# Patient Record
Sex: FEMALE | Race: WHITE | NOT HISPANIC OR LATINO | Employment: FULL TIME | URBAN - METROPOLITAN AREA
[De-identification: names, ages, dates, MRNs, and addresses within clinical notes are randomized per-mention and may not be internally consistent; named-entity substitution may affect disease eponyms.]

---

## 2021-05-29 ENCOUNTER — RECORDS - HEALTHEAST (OUTPATIENT)
Dept: ADMINISTRATIVE | Facility: CLINIC | Age: 26
End: 2021-05-29

## 2021-10-09 ENCOUNTER — HOSPITAL ENCOUNTER (EMERGENCY)
Facility: HOSPITAL | Age: 26
Discharge: LEFT BEFORE DISCHARGE | End: 2021-10-10

## 2021-10-09 VITALS
WEIGHT: 152 LBS | TEMPERATURE: 97.9 F | DIASTOLIC BLOOD PRESSURE: 89 MMHG | OXYGEN SATURATION: 98 % | HEART RATE: 130 BPM | BODY MASS INDEX: 21.76 KG/M2 | RESPIRATION RATE: 20 BRPM | SYSTOLIC BLOOD PRESSURE: 122 MMHG | HEIGHT: 70 IN

## 2021-10-09 ASSESSMENT — MIFFLIN-ST. JEOR: SCORE: 1504.72

## 2021-10-10 NOTE — ED TRIAGE NOTES
Pt just gotmarried tonight, normally does not drink but tonight had 6 or 7 shots of alcohol, had several brief syncopal events, and is feeling nauseated

## 2022-08-05 ENCOUNTER — LAB REQUISITION (OUTPATIENT)
Dept: LAB | Facility: CLINIC | Age: 27
End: 2022-08-05
Payer: OTHER GOVERNMENT

## 2022-08-05 ENCOUNTER — HOME INFUSION (PRE-WILLOW HOME INFUSION) (OUTPATIENT)
Dept: PHARMACY | Facility: CLINIC | Age: 27
End: 2022-08-05

## 2022-08-05 DIAGNOSIS — Z36.9 ENCOUNTER FOR ANTENATAL SCREENING, UNSPECIFIED: ICD-10-CM

## 2022-08-05 LAB
ABO/RH(D): NORMAL
ANTIBODY SCREEN: NEGATIVE
BASOPHILS # BLD AUTO: 0 10E3/UL (ref 0–0.2)
BASOPHILS NFR BLD AUTO: 0 %
EOSINOPHIL # BLD AUTO: 0.1 10E3/UL (ref 0–0.7)
EOSINOPHIL NFR BLD AUTO: 1 %
ERYTHROCYTE [DISTWIDTH] IN BLOOD BY AUTOMATED COUNT: 13.4 % (ref 10–15)
HCT VFR BLD AUTO: 42.6 % (ref 35–47)
HGB BLD-MCNC: 14.3 G/DL (ref 11.7–15.7)
IMM GRANULOCYTES # BLD: 0 10E3/UL
IMM GRANULOCYTES NFR BLD: 1 %
LYMPHOCYTES # BLD AUTO: 1.8 10E3/UL (ref 0.8–5.3)
LYMPHOCYTES NFR BLD AUTO: 24 %
MCH RBC QN AUTO: 31.4 PG (ref 26.5–33)
MCHC RBC AUTO-ENTMCNC: 33.6 G/DL (ref 31.5–36.5)
MCV RBC AUTO: 94 FL (ref 78–100)
MONOCYTES # BLD AUTO: 0.8 10E3/UL (ref 0–1.3)
MONOCYTES NFR BLD AUTO: 11 %
NEUTROPHILS # BLD AUTO: 4.8 10E3/UL (ref 1.6–8.3)
NEUTROPHILS NFR BLD AUTO: 63 %
NRBC # BLD AUTO: 0 10E3/UL
NRBC BLD AUTO-RTO: 0 /100
PLATELET # BLD AUTO: 251 10E3/UL (ref 150–450)
RBC # BLD AUTO: 4.55 10E6/UL (ref 3.8–5.2)
SPECIMEN EXPIRATION DATE: NORMAL
WBC # BLD AUTO: 7.5 10E3/UL (ref 4–11)

## 2022-08-05 PROCEDURE — 86803 HEPATITIS C AB TEST: CPT | Mod: ORL | Performed by: NURSE PRACTITIONER

## 2022-08-05 PROCEDURE — 87591 N.GONORRHOEAE DNA AMP PROB: CPT | Mod: ORL | Performed by: NURSE PRACTITIONER

## 2022-08-05 PROCEDURE — 87340 HEPATITIS B SURFACE AG IA: CPT | Mod: ORL | Performed by: NURSE PRACTITIONER

## 2022-08-05 PROCEDURE — 87491 CHLMYD TRACH DNA AMP PROBE: CPT | Mod: ORL | Performed by: NURSE PRACTITIONER

## 2022-08-05 PROCEDURE — 86901 BLOOD TYPING SEROLOGIC RH(D): CPT | Mod: ORL | Performed by: NURSE PRACTITIONER

## 2022-08-05 PROCEDURE — 85025 COMPLETE CBC W/AUTO DIFF WBC: CPT | Mod: ORL | Performed by: NURSE PRACTITIONER

## 2022-08-05 PROCEDURE — 86850 RBC ANTIBODY SCREEN: CPT | Mod: ORL | Performed by: NURSE PRACTITIONER

## 2022-08-05 PROCEDURE — 87389 HIV-1 AG W/HIV-1&-2 AB AG IA: CPT | Mod: ORL | Performed by: NURSE PRACTITIONER

## 2022-08-05 PROCEDURE — 86592 SYPHILIS TEST NON-TREP QUAL: CPT | Mod: ORL | Performed by: NURSE PRACTITIONER

## 2022-08-05 PROCEDURE — 86762 RUBELLA ANTIBODY: CPT | Mod: ORL | Performed by: NURSE PRACTITIONER

## 2022-08-05 PROCEDURE — 87086 URINE CULTURE/COLONY COUNT: CPT | Mod: ORL | Performed by: NURSE PRACTITIONER

## 2022-08-05 PROCEDURE — 84999 UNLISTED CHEMISTRY PROCEDURE: CPT | Mod: ORL | Performed by: NURSE PRACTITIONER

## 2022-08-06 LAB
C TRACH DNA SPEC QL PROBE+SIG AMP: NEGATIVE
HIV 1+2 AB+HIV1 P24 AG SERPL QL IA: NONREACTIVE
N GONORRHOEA DNA SPEC QL NAA+PROBE: NEGATIVE

## 2022-08-07 LAB — BACTERIA UR CULT: NORMAL

## 2022-08-08 LAB
HBV SURFACE AG SERPL QL IA: NONREACTIVE
HCV AB SERPL QL IA: NONREACTIVE
RUBV IGG SERPL QL IA: 0.98 INDEX
RUBV IGG SERPL QL IA: NORMAL

## 2022-08-10 DIAGNOSIS — O21.0 HYPEREMESIS GRAVIDARUM: ICD-10-CM

## 2022-08-10 RX ORDER — HEPARIN SODIUM (PORCINE) LOCK FLUSH IV SOLN 100 UNIT/ML 100 UNIT/ML
5 SOLUTION INTRAVENOUS
Status: CANCELLED | OUTPATIENT
Start: 2022-08-10

## 2022-08-10 RX ORDER — ONDANSETRON 2 MG/ML
4 INJECTION INTRAMUSCULAR; INTRAVENOUS ONCE
Status: CANCELLED | OUTPATIENT
Start: 2022-08-10 | End: 2022-08-10

## 2022-08-10 RX ORDER — HEPARIN SODIUM,PORCINE 10 UNIT/ML
5 VIAL (ML) INTRAVENOUS
Status: CANCELLED | OUTPATIENT
Start: 2022-08-10

## 2022-08-10 NOTE — PROGRESS NOTES
This is a recent snapshot of the patient's [facility] medical record.  For current drug dose and complete information and questions, call [phone] or In Basket pool, fv home infusion ([id number])  CSN Number:  [id number]

## 2022-08-12 LAB
Lab: NORMAL
PERFORMING LABORATORY: NORMAL
SPECIMEN STATUS: NORMAL
TEST NAME: NORMAL

## 2022-08-14 LAB — MISCELLANEOUS TEST 1 (ARUP): NORMAL

## 2022-08-24 ENCOUNTER — INFUSION THERAPY VISIT (OUTPATIENT)
Dept: INFUSION THERAPY | Facility: CLINIC | Age: 27
End: 2022-08-24
Attending: NURSE PRACTITIONER
Payer: OTHER GOVERNMENT

## 2022-08-24 VITALS
SYSTOLIC BLOOD PRESSURE: 123 MMHG | HEART RATE: 92 BPM | OXYGEN SATURATION: 98 % | TEMPERATURE: 98.2 F | RESPIRATION RATE: 16 BRPM | DIASTOLIC BLOOD PRESSURE: 77 MMHG

## 2022-08-24 DIAGNOSIS — O21.0 HYPEREMESIS GRAVIDARUM: Primary | ICD-10-CM

## 2022-08-24 PROCEDURE — 250N000011 HC RX IP 250 OP 636: Performed by: NURSE PRACTITIONER

## 2022-08-24 PROCEDURE — 250N000009 HC RX 250: Performed by: NURSE PRACTITIONER

## 2022-08-24 PROCEDURE — 258N000003 HC RX IP 258 OP 636: Performed by: NURSE PRACTITIONER

## 2022-08-24 PROCEDURE — 96375 TX/PRO/DX INJ NEW DRUG ADDON: CPT

## 2022-08-24 PROCEDURE — 96361 HYDRATE IV INFUSION ADD-ON: CPT

## 2022-08-24 PROCEDURE — 96365 THER/PROPH/DIAG IV INF INIT: CPT

## 2022-08-24 RX ORDER — ONDANSETRON 2 MG/ML
4 INJECTION INTRAMUSCULAR; INTRAVENOUS EVERY 6 HOURS PRN
Status: CANCELLED
Start: 2022-08-24

## 2022-08-24 RX ORDER — HEPARIN SODIUM,PORCINE 10 UNIT/ML
5 VIAL (ML) INTRAVENOUS
Status: CANCELLED | OUTPATIENT
Start: 2022-08-24

## 2022-08-24 RX ORDER — ONDANSETRON 2 MG/ML
4 INJECTION INTRAMUSCULAR; INTRAVENOUS ONCE
Status: CANCELLED | OUTPATIENT
Start: 2022-08-24 | End: 2022-08-24

## 2022-08-24 RX ORDER — VENLAFAXINE 37.5 MG/1
37.5 TABLET ORAL ONCE
Status: ON HOLD | COMMUNITY
End: 2023-03-10

## 2022-08-24 RX ORDER — LACTOBACILLUS ACIDOPHILUS/PECT 30 MG-20MG
TABLET ORAL
Status: ON HOLD | COMMUNITY
End: 2023-03-12

## 2022-08-24 RX ORDER — ONDANSETRON 2 MG/ML
4 INJECTION INTRAMUSCULAR; INTRAVENOUS ONCE
Status: COMPLETED | OUTPATIENT
Start: 2022-08-24 | End: 2022-08-24

## 2022-08-24 RX ORDER — HEPARIN SODIUM (PORCINE) LOCK FLUSH IV SOLN 100 UNIT/ML 100 UNIT/ML
5 SOLUTION INTRAVENOUS
Status: CANCELLED | OUTPATIENT
Start: 2022-08-24

## 2022-08-24 RX ADMIN — ONDANSETRON 4 MG: 2 INJECTION INTRAMUSCULAR; INTRAVENOUS at 13:14

## 2022-08-24 RX ADMIN — SODIUM CHLORIDE, POTASSIUM CHLORIDE, SODIUM LACTATE AND CALCIUM CHLORIDE 1000 ML: 600; 310; 30; 20 INJECTION, SOLUTION INTRAVENOUS at 13:05

## 2022-08-24 RX ADMIN — ASCORBIC ACID, VITAMIN A PALMITATE, CHOLECALCIFEROL, THIAMINE HYDROCHLORIDE, RIBOFLAVIN-5 PHOSPHATE SODIUM, PYRIDOXINE HYDROCHLORIDE, NIACINAMIDE, DEXPANTHENOL, ALPHA-TOCOPHEROL ACETATE, VITAMIN K1, FOLIC ACID, BIOTIN, CYANOCOBALAMIN: 200; 3300; 200; 6; 3.6; 6; 40; 15; 10; 150; 600; 60; 5 INJECTION, SOLUTION INTRAVENOUS at 14:16

## 2022-08-24 NOTE — PROGRESS NOTES
Infusion Nursing Note:  Stefany Slater presents today for IVF and antiemetics.    Patient seen by provider today: No   present during visit today: Not Applicable.    Note: Pt arrives to infusion today super nauseous. She reports she vomits 15-30x/day. She is unable to keep any fluids or food down. Denies dizziness/lightheadedness.     Pt reports feeling much better after administration of Zofran.     Intravenous Access:  Peripheral IV placed.    Treatment Conditions:  Not Applicable.    Post Infusion Assessment:  Patient tolerated infusion without incident.  Blood return noted pre and post infusion.  Site patent and intact, free from redness, edema or discomfort.  No evidence of extravasations.  Access discontinued per protocol.     Discharge Plan:   Patient will return as needed for next appointment.   Patient discharged in stable condition accompanied by: self.  Departure Mode: Ambulatory.      Annamarie Rao RN

## 2023-01-06 ENCOUNTER — LAB REQUISITION (OUTPATIENT)
Dept: LAB | Facility: CLINIC | Age: 28
End: 2023-01-06
Payer: OTHER GOVERNMENT

## 2023-01-06 DIAGNOSIS — Z3A.29 29 WEEKS GESTATION OF PREGNANCY: ICD-10-CM

## 2023-01-06 DIAGNOSIS — O99.019 ANEMIA COMPLICATING PREGNANCY, UNSPECIFIED TRIMESTER: ICD-10-CM

## 2023-01-06 DIAGNOSIS — Z01.83 ENCOUNTER FOR BLOOD TYPING: ICD-10-CM

## 2023-01-06 LAB
ERYTHROCYTE [DISTWIDTH] IN BLOOD BY AUTOMATED COUNT: 12.9 % (ref 10–15)
HCT VFR BLD AUTO: 29.5 % (ref 35–47)
HGB BLD-MCNC: 9.5 G/DL (ref 11.7–15.7)
MCH RBC QN AUTO: 29.3 PG (ref 26.5–33)
MCHC RBC AUTO-ENTMCNC: 32.2 G/DL (ref 31.5–36.5)
MCV RBC AUTO: 91 FL (ref 78–100)
PLATELET # BLD AUTO: 279 10E3/UL (ref 150–450)
RBC # BLD AUTO: 3.24 10E6/UL (ref 3.8–5.2)
WBC # BLD AUTO: 9.1 10E3/UL (ref 4–11)

## 2023-01-06 PROCEDURE — 86780 TREPONEMA PALLIDUM: CPT | Mod: ORL | Performed by: OBSTETRICS & GYNECOLOGY

## 2023-01-06 PROCEDURE — 86850 RBC ANTIBODY SCREEN: CPT | Mod: ORL | Performed by: OBSTETRICS & GYNECOLOGY

## 2023-01-06 PROCEDURE — 82728 ASSAY OF FERRITIN: CPT | Mod: ORL | Performed by: OBSTETRICS & GYNECOLOGY

## 2023-01-06 PROCEDURE — 85027 COMPLETE CBC AUTOMATED: CPT | Mod: ORL | Performed by: OBSTETRICS & GYNECOLOGY

## 2023-01-07 LAB
ANTIBODY SCREEN: NEGATIVE
FERRITIN SERPL-MCNC: 9 NG/ML (ref 6–175)
SPECIMEN EXPIRATION DATE: NORMAL
T PALLIDUM AB SER QL: NONREACTIVE

## 2023-02-02 ENCOUNTER — HOSPITAL ENCOUNTER (OUTPATIENT)
Facility: HOSPITAL | Age: 28
End: 2023-02-02
Admitting: OBSTETRICS & GYNECOLOGY
Payer: OTHER GOVERNMENT

## 2023-02-02 ENCOUNTER — HOSPITAL ENCOUNTER (OUTPATIENT)
Facility: HOSPITAL | Age: 28
Discharge: HOME | End: 2023-02-02
Attending: OBSTETRICS & GYNECOLOGY | Admitting: OBSTETRICS & GYNECOLOGY
Payer: OTHER GOVERNMENT

## 2023-02-02 ENCOUNTER — APPOINTMENT (OUTPATIENT)
Dept: ULTRASOUND IMAGING | Facility: HOSPITAL | Age: 28
End: 2023-02-02
Attending: OBSTETRICS & GYNECOLOGY
Payer: OTHER GOVERNMENT

## 2023-02-02 VITALS
RESPIRATION RATE: 18 BRPM | BODY MASS INDEX: 27.63 KG/M2 | SYSTOLIC BLOOD PRESSURE: 123 MMHG | HEIGHT: 70 IN | TEMPERATURE: 97.9 F | HEART RATE: 96 BPM | DIASTOLIC BLOOD PRESSURE: 81 MMHG | OXYGEN SATURATION: 97 % | WEIGHT: 193 LBS

## 2023-02-02 LAB
ALBUMIN UR-MCNC: NEGATIVE MG/DL
APPEARANCE UR: CLEAR
BILIRUB UR QL STRIP: NEGATIVE
COLOR UR AUTO: NORMAL
GLUCOSE UR STRIP-MCNC: NEGATIVE MG/DL
HGB UR QL STRIP: NEGATIVE
KETONES UR STRIP-MCNC: NEGATIVE MG/DL
LEUKOCYTE ESTERASE UR QL STRIP: NEGATIVE
NITRATE UR QL: NEGATIVE
PH UR STRIP: 5.5 [PH] (ref 5–7)
SP GR UR STRIP: 1.01 (ref 1–1.03)
UROBILINOGEN UR STRIP-MCNC: <2 MG/DL

## 2023-02-02 PROCEDURE — 250N000013 HC RX MED GY IP 250 OP 250 PS 637: Performed by: OBSTETRICS & GYNECOLOGY

## 2023-02-02 PROCEDURE — G0463 HOSPITAL OUTPT CLINIC VISIT: HCPCS

## 2023-02-02 PROCEDURE — 81003 URINALYSIS AUTO W/O SCOPE: CPT | Performed by: OBSTETRICS & GYNECOLOGY

## 2023-02-02 PROCEDURE — 76770 US EXAM ABDO BACK WALL COMP: CPT

## 2023-02-02 RX ORDER — ACETAMINOPHEN 500 MG
1000 TABLET ORAL ONCE
Status: COMPLETED | OUTPATIENT
Start: 2023-02-02 | End: 2023-02-02

## 2023-02-02 RX ORDER — FERROUS SULFATE 325(65) MG
325 TABLET ORAL
COMMUNITY

## 2023-02-02 RX ORDER — LIDOCAINE 40 MG/G
CREAM TOPICAL
Status: DISCONTINUED | OUTPATIENT
Start: 2023-02-02 | End: 2023-02-02 | Stop reason: HOSPADM

## 2023-02-02 RX ADMIN — ACETAMINOPHEN 1000 MG: 500 TABLET ORAL at 07:28

## 2023-02-02 ASSESSMENT — ACTIVITIES OF DAILY LIVING (ADL)
ADLS_ACUITY_SCORE: 31
ADLS_ACUITY_SCORE: 31

## 2023-02-02 NOTE — PROGRESS NOTES
Stefany is feeling constant back pain/cramping on the L side. Tenderness on percussion. UA is negative, abdomen soft, nontender. Pt denies any vaginal bleeding or leaking of fluids and reports positive fetal movement. Kidney US is ordered.

## 2023-02-02 NOTE — PROGRESS NOTES
Dr Hines called and notified of US reports. Order to discharge patient home. Pt given discharge paperwork and instructed to return to the hospital if any vaginal bleeding or leaking of fluids, painful, regular contractions or decreased fetal movement. Pt verbalizes understanding.

## 2023-02-02 NOTE — DISCHARGE INSTRUCTIONS
Discharge Instruction for Undelivered Patients  Call your provider if you notice:  Swelling in your face or increased swelling in your hands or legs.  Headaches that are not relieved by Tylenol (acetaminophen).  Changes in your vision (blurring: seeing spots or stars.)  Nausea (sick to your stomach) and vomiting (throwing up).   Weight gain of 5 pounds or more per week.  Heartburn that doesn't go away.  Signs of bladder infection: pain when you urinate (use the toilet), need to go more often and more urgently.  The bag of gaines (rupture of membranes) breaks, or you notice leaking in your underwear.  Bright red blood in your underwear.  Abdominal (lower belly) or stomach pain.  For first baby: Contractions (tightening) less than 5 minutes apart for one hour or more.  Second (plus) baby: Contractions (tightening) less than 10 minutes apart and getting stronger.  *If less than 34 weeks: Contractions (tightening) more than 6 times in one hour.  Increase or change in vaginal discharge (note the color and amount)  Other: ***    Follow-up:  Follow up at scheduled OB visit. Increase water intake, rest, hot pack for back pain and Tylenol as needed.               Understanding Hydronephrosis   Hydronephrosis is when your kidneys fill with too much urine and swell up. It s cause by a problem in the urinary tract that stops urine from draining normally.   [id number]  How hydronephrosis happens   Urine usually flows from the kidneys down through the ureters, bladder, and out of the body through the urethra. Hydronephrosis occurs when something blocks this flow of urine.    What causes hydronephrosis?   The condition has many possible causes. They include:  Narrowing (stricture) in a tube that drains urine (ureter or urethra)  Kidney stone  Blood clot  Enlarged prostate  Cancer growth  Infection  Pregnancy  Fibroids in the uterus  Injury   Symptoms of hydronephrosis   Symptoms may include:  Pain in your side and  back  Pain when urinating  Nausea and vomiting  Small amount of urine or no urine  Urinating often or feeling the urge to urinate often  Urinary incontinence  Blood in urine  Fever   Symptoms will depend on the cause of the blockage and how serious it is.   Diagnosing hydronephrosis  Your healthcare provider will ask about your symptoms and health history. He or she will give you a physical exam. The physical exam may include a rectal exam or a pelvic exam. You may also have tests such as:   Blood tests. These are done to see how well your kidneys are filtering the blood.  Urine test. This is done to look for infections and other problems.             Imaging test. You may have an ultrasound, MRI, or CT scan. These tests make images of your organs and other tissues. They can show blockages, growths, and other problems.  Other imaging tests. You may also have a cystourethrogram, ureterogram, or renogram. These tests create special detailed images of the bladder, ureters, and kidneys.   Treatment for hydronephrosis   Treatment depends on what is causing the problem. Your healthcare providers will need to drain the urine, and treat the cause. You may also see a urologist, gynecologist, or oncologist for treatment.    Treatment may include:  Tube to drain urine. A thin, flexible tube (catheter) may be put into your urethra or through a small cut in the abdomen, and up into the kidney. A small tube (stent) may be put in near the kidney to keep the urine draining. Or a tube may be put through a cut in the skin directly into your kidney.  Surgery. You may need surgery to fix something blocking urine, such as a growth or stricture.   You may also be treated with:  Medicines to treat pain, nausea, and vomiting  Antibiotics to treat an infection   Possible complications of hydronephrosis   In some cases, hydronephrosis may cause long-term (permanent) kidney damage.   When to call your healthcare provider   Call your  healthcare provider if you have any of the following:  Fever of 100.4 F (38 C) or higher, or as directed by your healthcare provider  Pain that gets worse  Symptoms that don t get better, or get worse  New symptoms  Rabia last reviewed this educational content on 12/1/2017 2000-2021 [employer]. All rights reserved. This information is not intended as a substitute for professional medical care. Always follow your healthcare professional's instructions.

## 2023-02-22 ENCOUNTER — LAB REQUISITION (OUTPATIENT)
Dept: LAB | Facility: CLINIC | Age: 28
End: 2023-02-22
Payer: OTHER GOVERNMENT

## 2023-02-22 DIAGNOSIS — Z3A.36 36 WEEKS GESTATION OF PREGNANCY: ICD-10-CM

## 2023-02-22 PROCEDURE — 87653 STREP B DNA AMP PROBE: CPT | Mod: ORL | Performed by: OBSTETRICS & GYNECOLOGY

## 2023-02-23 LAB — GP B STREP DNA SPEC QL NAA+PROBE: NEGATIVE

## 2023-03-09 ENCOUNTER — HOSPITAL ENCOUNTER (INPATIENT)
Facility: CLINIC | Age: 28
LOS: 2 days | Discharge: HOME HEALTH | End: 2023-03-12
Attending: OBSTETRICS & GYNECOLOGY | Admitting: OBSTETRICS & GYNECOLOGY
Payer: OTHER GOVERNMENT

## 2023-03-09 LAB
ABO/RH(D): ABNORMAL
ANTIBODY SCREEN: POSITIVE
SPECIMEN EXPIRATION DATE: ABNORMAL

## 2023-03-09 PROCEDURE — 84156 ASSAY OF PROTEIN URINE: CPT | Performed by: OBSTETRICS & GYNECOLOGY

## 2023-03-09 ASSESSMENT — ACTIVITIES OF DAILY LIVING (ADL): ADLS_ACUITY_SCORE: 35

## 2023-03-10 ENCOUNTER — ANESTHESIA (OUTPATIENT)
Dept: OBGYN | Facility: CLINIC | Age: 28
End: 2023-03-10
Payer: OTHER GOVERNMENT

## 2023-03-10 ENCOUNTER — ANESTHESIA EVENT (OUTPATIENT)
Dept: OBGYN | Facility: CLINIC | Age: 28
End: 2023-03-10
Payer: OTHER GOVERNMENT

## 2023-03-10 PROBLEM — O16.9 HYPERTENSION COMPLICATING PREGNANCY: Status: ACTIVE | Noted: 2023-03-10

## 2023-03-10 LAB
ALBUMIN MFR UR ELPH: 13.2 MG/DL (ref 1–14)
ALT SERPL W P-5'-P-CCNC: <9 U/L (ref 0–45)
ANION GAP SERPL CALCULATED.3IONS-SCNC: 8 MMOL/L (ref 5–18)
ANTIBODY ID: NORMAL
AST SERPL W P-5'-P-CCNC: 15 U/L (ref 0–40)
BUN SERPL-MCNC: 5 MG/DL (ref 8–22)
CALCIUM SERPL-MCNC: 9.9 MG/DL (ref 8.5–10.5)
CHLORIDE BLD-SCNC: 107 MMOL/L (ref 98–107)
CO2 SERPL-SCNC: 24 MMOL/L (ref 22–31)
CREAT SERPL-MCNC: 0.55 MG/DL (ref 0.6–1.1)
CREAT UR-MCNC: 73 MG/DL
ERYTHROCYTE [DISTWIDTH] IN BLOOD BY AUTOMATED COUNT: 15.8 % (ref 10–15)
GFR SERPL CREATININE-BSD FRML MDRD: >90 ML/MIN/1.73M2
GLUCOSE BLD-MCNC: 75 MG/DL (ref 70–125)
HCT VFR BLD AUTO: 31.8 % (ref 35–47)
HGB BLD-MCNC: 10.2 G/DL (ref 11.7–15.7)
MCH RBC QN AUTO: 27 PG (ref 26.5–33)
MCHC RBC AUTO-ENTMCNC: 32.1 G/DL (ref 31.5–36.5)
MCV RBC AUTO: 84 FL (ref 78–100)
PLATELET # BLD AUTO: 227 10E3/UL (ref 150–450)
POTASSIUM BLD-SCNC: 4.3 MMOL/L (ref 3.5–5)
PROT/CREAT 24H UR: 0.18 MG/MG CR
RBC # BLD AUTO: 3.78 10E6/UL (ref 3.8–5.2)
SODIUM SERPL-SCNC: 139 MMOL/L (ref 136–145)
SPECIMEN EXPIRATION DATE: NORMAL
T PALLIDUM AB SER QL: NONREACTIVE
URATE SERPL-MCNC: 4.9 MG/DL (ref 2–7.5)
WBC # BLD AUTO: 8.4 10E3/UL (ref 4–11)

## 2023-03-10 PROCEDURE — 250N000011 HC RX IP 250 OP 636: Performed by: OBSTETRICS & GYNECOLOGY

## 2023-03-10 PROCEDURE — 84550 ASSAY OF BLOOD/URIC ACID: CPT | Performed by: OBSTETRICS & GYNECOLOGY

## 2023-03-10 PROCEDURE — 36415 COLL VENOUS BLD VENIPUNCTURE: CPT | Performed by: OBSTETRICS & GYNECOLOGY

## 2023-03-10 PROCEDURE — 86780 TREPONEMA PALLIDUM: CPT | Performed by: OBSTETRICS & GYNECOLOGY

## 2023-03-10 PROCEDURE — 250N000009 HC RX 250: Performed by: OBSTETRICS & GYNECOLOGY

## 2023-03-10 PROCEDURE — 250N000011 HC RX IP 250 OP 636: Performed by: STUDENT IN AN ORGANIZED HEALTH CARE EDUCATION/TRAINING PROGRAM

## 2023-03-10 PROCEDURE — 00HU33Z INSERTION OF INFUSION DEVICE INTO SPINAL CANAL, PERCUTANEOUS APPROACH: ICD-10-PCS | Performed by: ANESTHESIOLOGY

## 2023-03-10 PROCEDURE — 250N000011 HC RX IP 250 OP 636: Performed by: ANESTHESIOLOGY

## 2023-03-10 PROCEDURE — 85027 COMPLETE CBC AUTOMATED: CPT | Performed by: OBSTETRICS & GYNECOLOGY

## 2023-03-10 PROCEDURE — 86920 COMPATIBILITY TEST SPIN: CPT | Performed by: OBSTETRICS & GYNECOLOGY

## 2023-03-10 PROCEDURE — 722N000001 HC LABOR CARE VAGINAL DELIVERY SINGLE

## 2023-03-10 PROCEDURE — 86870 RBC ANTIBODY IDENTIFICATION: CPT | Performed by: OBSTETRICS & GYNECOLOGY

## 2023-03-10 PROCEDURE — 86901 BLOOD TYPING SEROLOGIC RH(D): CPT | Performed by: OBSTETRICS & GYNECOLOGY

## 2023-03-10 PROCEDURE — 3E0R3BZ INTRODUCTION OF ANESTHETIC AGENT INTO SPINAL CANAL, PERCUTANEOUS APPROACH: ICD-10-PCS | Performed by: ANESTHESIOLOGY

## 2023-03-10 PROCEDURE — 84450 TRANSFERASE (AST) (SGOT): CPT | Performed by: OBSTETRICS & GYNECOLOGY

## 2023-03-10 PROCEDURE — 86850 RBC ANTIBODY SCREEN: CPT | Performed by: OBSTETRICS & GYNECOLOGY

## 2023-03-10 PROCEDURE — 250N000013 HC RX MED GY IP 250 OP 250 PS 637: Performed by: OBSTETRICS & GYNECOLOGY

## 2023-03-10 PROCEDURE — 250N000009 HC RX 250: Performed by: ANESTHESIOLOGY

## 2023-03-10 PROCEDURE — 999N000016 HC STATISTIC ATTENDANCE AT DELIVERY

## 2023-03-10 PROCEDURE — 84460 ALANINE AMINO (ALT) (SGPT): CPT | Performed by: OBSTETRICS & GYNECOLOGY

## 2023-03-10 PROCEDURE — 80048 BASIC METABOLIC PNL TOTAL CA: CPT | Performed by: OBSTETRICS & GYNECOLOGY

## 2023-03-10 PROCEDURE — 0KQM0ZZ REPAIR PERINEUM MUSCLE, OPEN APPROACH: ICD-10-PCS | Performed by: OBSTETRICS & GYNECOLOGY

## 2023-03-10 PROCEDURE — 370N000003 HC ANESTHESIA WARD SERVICE

## 2023-03-10 PROCEDURE — 258N000003 HC RX IP 258 OP 636: Performed by: OBSTETRICS & GYNECOLOGY

## 2023-03-10 PROCEDURE — 120N000001 HC R&B MED SURG/OB

## 2023-03-10 RX ORDER — VENLAFAXINE 37.5 MG/1
37.5 TABLET ORAL ONCE
Status: DISCONTINUED | OUTPATIENT
Start: 2023-03-10 | End: 2023-03-10

## 2023-03-10 RX ORDER — MODIFIED LANOLIN
OINTMENT (GRAM) TOPICAL
Status: DISCONTINUED | OUTPATIENT
Start: 2023-03-10 | End: 2023-03-12 | Stop reason: HOSPADM

## 2023-03-10 RX ORDER — METHYLERGONOVINE MALEATE 0.2 MG/ML
200 INJECTION INTRAVENOUS
Status: DISCONTINUED | OUTPATIENT
Start: 2023-03-10 | End: 2023-03-10 | Stop reason: HOSPADM

## 2023-03-10 RX ORDER — IBUPROFEN 800 MG/1
800 TABLET, FILM COATED ORAL EVERY 6 HOURS PRN
Status: DISCONTINUED | OUTPATIENT
Start: 2023-03-10 | End: 2023-03-12 | Stop reason: HOSPADM

## 2023-03-10 RX ORDER — ONDANSETRON 2 MG/ML
4 INJECTION INTRAMUSCULAR; INTRAVENOUS EVERY 6 HOURS PRN
Status: DISCONTINUED | OUTPATIENT
Start: 2023-03-10 | End: 2023-03-10 | Stop reason: HOSPADM

## 2023-03-10 RX ORDER — LABETALOL HYDROCHLORIDE 5 MG/ML
20 INJECTION, SOLUTION INTRAVENOUS
Status: DISCONTINUED | OUTPATIENT
Start: 2023-03-10 | End: 2023-03-12 | Stop reason: HOSPADM

## 2023-03-10 RX ORDER — CEFAZOLIN SODIUM 2 G/100ML
2 INJECTION, SOLUTION INTRAVENOUS
Status: DISCONTINUED | OUTPATIENT
Start: 2023-03-10 | End: 2023-03-12 | Stop reason: HOSPADM

## 2023-03-10 RX ORDER — VENLAFAXINE HYDROCHLORIDE 37.5 MG/1
37.5 CAPSULE, EXTENDED RELEASE ORAL DAILY
COMMUNITY

## 2023-03-10 RX ORDER — FENTANYL CITRATE 50 UG/ML
50 INJECTION, SOLUTION INTRAMUSCULAR; INTRAVENOUS EVERY 30 MIN PRN
Status: DISCONTINUED | OUTPATIENT
Start: 2023-03-10 | End: 2023-03-10 | Stop reason: HOSPADM

## 2023-03-10 RX ORDER — IBUPROFEN 800 MG/1
800 TABLET, FILM COATED ORAL
Status: DISCONTINUED | OUTPATIENT
Start: 2023-03-10 | End: 2023-03-12 | Stop reason: HOSPADM

## 2023-03-10 RX ORDER — MAGNESIUM SULFATE HEPTAHYDRATE 40 MG/ML
2 INJECTION, SOLUTION INTRAVENOUS
Status: DISCONTINUED | OUTPATIENT
Start: 2023-03-10 | End: 2023-03-12 | Stop reason: HOSPADM

## 2023-03-10 RX ORDER — TERBUTALINE SULFATE 1 MG/ML
0.25 INJECTION, SOLUTION SUBCUTANEOUS
Status: CANCELLED | OUTPATIENT
Start: 2023-03-10

## 2023-03-10 RX ORDER — KETOROLAC TROMETHAMINE 30 MG/ML
30 INJECTION, SOLUTION INTRAMUSCULAR; INTRAVENOUS
Status: DISCONTINUED | OUTPATIENT
Start: 2023-03-10 | End: 2023-03-12 | Stop reason: HOSPADM

## 2023-03-10 RX ORDER — LORAZEPAM 2 MG/ML
2 INJECTION INTRAMUSCULAR
Status: DISCONTINUED | OUTPATIENT
Start: 2023-03-10 | End: 2023-03-12 | Stop reason: HOSPADM

## 2023-03-10 RX ORDER — LIDOCAINE 40 MG/G
CREAM TOPICAL
Status: DISCONTINUED | OUTPATIENT
Start: 2023-03-10 | End: 2023-03-10 | Stop reason: HOSPADM

## 2023-03-10 RX ORDER — LIDOCAINE HCL/EPINEPHRINE/PF 2%-1:200K
VIAL (ML) INJECTION
Status: COMPLETED | OUTPATIENT
Start: 2023-03-10 | End: 2023-03-10

## 2023-03-10 RX ORDER — MISOPROSTOL 200 UG/1
400 TABLET ORAL
Status: DISCONTINUED | OUTPATIENT
Start: 2023-03-10 | End: 2023-03-12 | Stop reason: HOSPADM

## 2023-03-10 RX ORDER — FENTANYL CITRATE 50 UG/ML
100 INJECTION, SOLUTION INTRAMUSCULAR; INTRAVENOUS ONCE
Status: COMPLETED | OUTPATIENT
Start: 2023-03-10 | End: 2023-03-10

## 2023-03-10 RX ORDER — NALBUPHINE HYDROCHLORIDE 10 MG/ML
2.5-5 INJECTION, SOLUTION INTRAMUSCULAR; INTRAVENOUS; SUBCUTANEOUS EVERY 6 HOURS PRN
Status: DISCONTINUED | OUTPATIENT
Start: 2023-03-10 | End: 2023-03-12 | Stop reason: HOSPADM

## 2023-03-10 RX ORDER — CARBOPROST TROMETHAMINE 250 UG/ML
250 INJECTION, SOLUTION INTRAMUSCULAR
Status: DISCONTINUED | OUTPATIENT
Start: 2023-03-10 | End: 2023-03-10 | Stop reason: HOSPADM

## 2023-03-10 RX ORDER — MAGNESIUM SULFATE 4 G/50ML
4 INJECTION INTRAVENOUS
Status: DISCONTINUED | OUTPATIENT
Start: 2023-03-10 | End: 2023-03-12 | Stop reason: HOSPADM

## 2023-03-10 RX ORDER — METOCLOPRAMIDE HYDROCHLORIDE 5 MG/ML
10 INJECTION INTRAMUSCULAR; INTRAVENOUS EVERY 6 HOURS PRN
Status: DISCONTINUED | OUTPATIENT
Start: 2023-03-10 | End: 2023-03-10 | Stop reason: HOSPADM

## 2023-03-10 RX ORDER — SODIUM CHLORIDE, SODIUM LACTATE, POTASSIUM CHLORIDE, CALCIUM CHLORIDE 600; 310; 30; 20 MG/100ML; MG/100ML; MG/100ML; MG/100ML
INJECTION, SOLUTION INTRAVENOUS CONTINUOUS
Status: DISCONTINUED | OUTPATIENT
Start: 2023-03-11 | End: 2023-03-12 | Stop reason: HOSPADM

## 2023-03-10 RX ORDER — EPHEDRINE SULFATE 50 MG/ML
5 INJECTION, SOLUTION INTRAMUSCULAR; INTRAVENOUS; SUBCUTANEOUS
Status: DISCONTINUED | OUTPATIENT
Start: 2023-03-10 | End: 2023-03-10 | Stop reason: HOSPADM

## 2023-03-10 RX ORDER — MAGNESIUM SULFATE HEPTAHYDRATE 500 MG/ML
10 INJECTION, SOLUTION INTRAMUSCULAR; INTRAVENOUS
Status: DISCONTINUED | OUTPATIENT
Start: 2023-03-10 | End: 2023-03-12 | Stop reason: HOSPADM

## 2023-03-10 RX ORDER — DOCUSATE SODIUM 100 MG/1
100 CAPSULE, LIQUID FILLED ORAL DAILY
Status: DISCONTINUED | OUTPATIENT
Start: 2023-03-11 | End: 2023-03-12 | Stop reason: HOSPADM

## 2023-03-10 RX ORDER — PROCHLORPERAZINE 25 MG
25 SUPPOSITORY, RECTAL RECTAL EVERY 12 HOURS PRN
Status: DISCONTINUED | OUTPATIENT
Start: 2023-03-10 | End: 2023-03-10 | Stop reason: HOSPADM

## 2023-03-10 RX ORDER — BUPIVACAINE HYDROCHLORIDE 2.5 MG/ML
INJECTION, SOLUTION EPIDURAL; INFILTRATION; INTRACAUDAL
Status: COMPLETED | OUTPATIENT
Start: 2023-03-10 | End: 2023-03-10

## 2023-03-10 RX ORDER — ONDANSETRON 2 MG/ML
4 INJECTION INTRAMUSCULAR; INTRAVENOUS EVERY 6 HOURS PRN
Status: DISCONTINUED | OUTPATIENT
Start: 2023-03-10 | End: 2023-03-12 | Stop reason: HOSPADM

## 2023-03-10 RX ORDER — ACETAMINOPHEN 325 MG/1
650 TABLET ORAL EVERY 4 HOURS PRN
Status: DISCONTINUED | OUTPATIENT
Start: 2023-03-10 | End: 2023-03-12 | Stop reason: HOSPADM

## 2023-03-10 RX ORDER — PROCHLORPERAZINE MALEATE 10 MG
10 TABLET ORAL EVERY 6 HOURS PRN
Status: DISCONTINUED | OUTPATIENT
Start: 2023-03-10 | End: 2023-03-10 | Stop reason: HOSPADM

## 2023-03-10 RX ORDER — OXYTOCIN/0.9 % SODIUM CHLORIDE 30/500 ML
100-340 PLASTIC BAG, INJECTION (ML) INTRAVENOUS CONTINUOUS PRN
Status: DISCONTINUED | OUTPATIENT
Start: 2023-03-10 | End: 2023-03-12 | Stop reason: HOSPADM

## 2023-03-10 RX ORDER — MISOPROSTOL 100 UG/1
25 TABLET ORAL
Status: DISCONTINUED | OUTPATIENT
Start: 2023-03-10 | End: 2023-03-10 | Stop reason: HOSPADM

## 2023-03-10 RX ORDER — OXYTOCIN/0.9 % SODIUM CHLORIDE 30/500 ML
340 PLASTIC BAG, INJECTION (ML) INTRAVENOUS CONTINUOUS PRN
Status: DISCONTINUED | OUTPATIENT
Start: 2023-03-10 | End: 2023-03-10 | Stop reason: HOSPADM

## 2023-03-10 RX ORDER — OXYTOCIN/0.9 % SODIUM CHLORIDE 30/500 ML
340 PLASTIC BAG, INJECTION (ML) INTRAVENOUS CONTINUOUS PRN
Status: COMPLETED | OUTPATIENT
Start: 2023-03-10 | End: 2023-03-11

## 2023-03-10 RX ORDER — NIFEDIPINE 10 MG/1
10-20 CAPSULE ORAL
Status: DISCONTINUED | OUTPATIENT
Start: 2023-03-10 | End: 2023-03-12 | Stop reason: HOSPADM

## 2023-03-10 RX ORDER — OXYTOCIN 10 [USP'U]/ML
10 INJECTION, SOLUTION INTRAMUSCULAR; INTRAVENOUS
Status: DISCONTINUED | OUTPATIENT
Start: 2023-03-10 | End: 2023-03-10 | Stop reason: HOSPADM

## 2023-03-10 RX ORDER — ONDANSETRON 4 MG/1
4 TABLET, ORALLY DISINTEGRATING ORAL EVERY 6 HOURS PRN
Status: DISCONTINUED | OUTPATIENT
Start: 2023-03-10 | End: 2023-03-10 | Stop reason: HOSPADM

## 2023-03-10 RX ORDER — MISOPROSTOL 200 UG/1
800 TABLET ORAL
Status: DISCONTINUED | OUTPATIENT
Start: 2023-03-10 | End: 2023-03-12 | Stop reason: HOSPADM

## 2023-03-10 RX ORDER — NALOXONE HYDROCHLORIDE 0.4 MG/ML
0.2 INJECTION, SOLUTION INTRAMUSCULAR; INTRAVENOUS; SUBCUTANEOUS
Status: DISCONTINUED | OUTPATIENT
Start: 2023-03-10 | End: 2023-03-10 | Stop reason: HOSPADM

## 2023-03-10 RX ORDER — SODIUM CHLORIDE, SODIUM LACTATE, POTASSIUM CHLORIDE, CALCIUM CHLORIDE 600; 310; 30; 20 MG/100ML; MG/100ML; MG/100ML; MG/100ML
10-125 INJECTION, SOLUTION INTRAVENOUS CONTINUOUS
Status: DISCONTINUED | OUTPATIENT
Start: 2023-03-10 | End: 2023-03-12 | Stop reason: HOSPADM

## 2023-03-10 RX ORDER — CARBOPROST TROMETHAMINE 250 UG/ML
250 INJECTION, SOLUTION INTRAMUSCULAR
Status: DISCONTINUED | OUTPATIENT
Start: 2023-03-10 | End: 2023-03-12 | Stop reason: HOSPADM

## 2023-03-10 RX ORDER — HYDROXYZINE HYDROCHLORIDE 25 MG/1
100 TABLET, FILM COATED ORAL
Status: DISCONTINUED | OUTPATIENT
Start: 2023-03-10 | End: 2023-03-10 | Stop reason: HOSPADM

## 2023-03-10 RX ORDER — METOCLOPRAMIDE 10 MG/1
10 TABLET ORAL EVERY 6 HOURS PRN
Status: DISCONTINUED | OUTPATIENT
Start: 2023-03-10 | End: 2023-03-10 | Stop reason: HOSPADM

## 2023-03-10 RX ORDER — BISACODYL 10 MG
10 SUPPOSITORY, RECTAL RECTAL DAILY PRN
Status: DISCONTINUED | OUTPATIENT
Start: 2023-03-10 | End: 2023-03-12 | Stop reason: HOSPADM

## 2023-03-10 RX ORDER — CITRIC ACID/SODIUM CITRATE 334-500MG
30 SOLUTION, ORAL ORAL
Status: DISCONTINUED | OUTPATIENT
Start: 2023-03-10 | End: 2023-03-10 | Stop reason: HOSPADM

## 2023-03-10 RX ORDER — NALOXONE HYDROCHLORIDE 0.4 MG/ML
0.4 INJECTION, SOLUTION INTRAMUSCULAR; INTRAVENOUS; SUBCUTANEOUS
Status: DISCONTINUED | OUTPATIENT
Start: 2023-03-10 | End: 2023-03-10 | Stop reason: HOSPADM

## 2023-03-10 RX ORDER — OXYTOCIN 10 [USP'U]/ML
10 INJECTION, SOLUTION INTRAMUSCULAR; INTRAVENOUS
Status: DISCONTINUED | OUTPATIENT
Start: 2023-03-10 | End: 2023-03-12 | Stop reason: HOSPADM

## 2023-03-10 RX ORDER — HYDROCORTISONE 25 MG/G
CREAM TOPICAL 3 TIMES DAILY PRN
Status: DISCONTINUED | OUTPATIENT
Start: 2023-03-10 | End: 2023-03-12 | Stop reason: HOSPADM

## 2023-03-10 RX ORDER — VENLAFAXINE HYDROCHLORIDE 37.5 MG/1
37.5 CAPSULE, EXTENDED RELEASE ORAL
Status: DISCONTINUED | OUTPATIENT
Start: 2023-03-10 | End: 2023-03-12 | Stop reason: HOSPADM

## 2023-03-10 RX ORDER — METHYLERGONOVINE MALEATE 0.2 MG/ML
200 INJECTION INTRAVENOUS
Status: DISCONTINUED | OUTPATIENT
Start: 2023-03-10 | End: 2023-03-12 | Stop reason: HOSPADM

## 2023-03-10 RX ORDER — MISOPROSTOL 200 UG/1
800 TABLET ORAL
Status: DISCONTINUED | OUTPATIENT
Start: 2023-03-10 | End: 2023-03-10 | Stop reason: HOSPADM

## 2023-03-10 RX ORDER — LIDOCAINE 40 MG/G
CREAM TOPICAL
Status: CANCELLED | OUTPATIENT
Start: 2023-03-10

## 2023-03-10 RX ORDER — OXYTOCIN/0.9 % SODIUM CHLORIDE 30/500 ML
1-24 PLASTIC BAG, INJECTION (ML) INTRAVENOUS CONTINUOUS
Status: CANCELLED | OUTPATIENT
Start: 2023-03-10

## 2023-03-10 RX ORDER — SODIUM CHLORIDE, SODIUM LACTATE, POTASSIUM CHLORIDE, CALCIUM CHLORIDE 600; 310; 30; 20 MG/100ML; MG/100ML; MG/100ML; MG/100ML
INJECTION, SOLUTION INTRAVENOUS CONTINUOUS PRN
Status: CANCELLED | OUTPATIENT
Start: 2023-03-10

## 2023-03-10 RX ORDER — MISOPROSTOL 200 UG/1
400 TABLET ORAL
Status: DISCONTINUED | OUTPATIENT
Start: 2023-03-10 | End: 2023-03-10 | Stop reason: HOSPADM

## 2023-03-10 RX ORDER — MORPHINE SULFATE 10 MG/ML
10 INJECTION, SOLUTION INTRAMUSCULAR; INTRAVENOUS
Status: COMPLETED | OUTPATIENT
Start: 2023-03-10 | End: 2023-03-10

## 2023-03-10 RX ORDER — FENTANYL/ROPIVACAINE/NS/PF 2MCG/ML-.1
PLASTIC BAG, INJECTION (ML) EPIDURAL
Status: DISCONTINUED | OUTPATIENT
Start: 2023-03-10 | End: 2023-03-10 | Stop reason: HOSPADM

## 2023-03-10 RX ADMIN — BUPIVACAINE HYDROCHLORIDE 5 ML: 2.5 INJECTION, SOLUTION EPIDURAL; INFILTRATION; INTRACAUDAL at 10:53

## 2023-03-10 RX ADMIN — KETOROLAC TROMETHAMINE 30 MG: 30 INJECTION, SOLUTION INTRAMUSCULAR at 22:02

## 2023-03-10 RX ADMIN — MISOPROSTOL 25 MCG: 100 TABLET ORAL at 05:00

## 2023-03-10 RX ADMIN — LIDOCAINE HYDROCHLORIDE,EPINEPHRINE BITARTRATE 3 ML: 20; .005 INJECTION, SOLUTION EPIDURAL; INFILTRATION; INTRACAUDAL; PERINEURAL at 10:51

## 2023-03-10 RX ADMIN — SODIUM CHLORIDE, POTASSIUM CHLORIDE, SODIUM LACTATE AND CALCIUM CHLORIDE 125 ML/HR: 600; 310; 30; 20 INJECTION, SOLUTION INTRAVENOUS at 21:30

## 2023-03-10 RX ADMIN — ONDANSETRON 4 MG: 2 INJECTION INTRAMUSCULAR; INTRAVENOUS at 02:09

## 2023-03-10 RX ADMIN — MISOPROSTOL 25 MCG: 100 TABLET ORAL at 09:02

## 2023-03-10 RX ADMIN — ONDANSETRON 4 MG: 2 INJECTION INTRAMUSCULAR; INTRAVENOUS at 16:40

## 2023-03-10 RX ADMIN — Medication: at 10:45

## 2023-03-10 RX ADMIN — MISOPROSTOL 25 MCG: 100 TABLET ORAL at 02:56

## 2023-03-10 RX ADMIN — PROCHLORPERAZINE EDISYLATE 10 MG: 5 INJECTION INTRAMUSCULAR; INTRAVENOUS at 03:19

## 2023-03-10 RX ADMIN — SODIUM CHLORIDE, POTASSIUM CHLORIDE, SODIUM LACTATE AND CALCIUM CHLORIDE 125 ML/HR: 600; 310; 30; 20 INJECTION, SOLUTION INTRAVENOUS at 02:09

## 2023-03-10 RX ADMIN — Medication 340 ML/HR: at 21:54

## 2023-03-10 RX ADMIN — HYDROXYZINE HYDROCHLORIDE 100 MG: 25 TABLET ORAL at 02:56

## 2023-03-10 RX ADMIN — FENTANYL CITRATE 50 MCG: 50 INJECTION, SOLUTION INTRAMUSCULAR; INTRAVENOUS at 09:12

## 2023-03-10 RX ADMIN — ACETAMINOPHEN 650 MG: 325 TABLET ORAL at 02:55

## 2023-03-10 RX ADMIN — ACETAMINOPHEN 650 MG: 325 TABLET ORAL at 07:00

## 2023-03-10 RX ADMIN — ACETAMINOPHEN 650 MG: 325 TABLET ORAL at 16:40

## 2023-03-10 RX ADMIN — MISOPROSTOL 25 MCG: 100 TABLET ORAL at 07:00

## 2023-03-10 RX ADMIN — MORPHINE SULFATE 10 MG: 10 INJECTION INTRAVENOUS at 02:57

## 2023-03-10 RX ADMIN — FENTANYL CITRATE 100 MCG: 50 INJECTION, SOLUTION INTRAMUSCULAR; INTRAVENOUS at 23:08

## 2023-03-10 RX ADMIN — MISOPROSTOL 400 MCG: 200 TABLET ORAL at 23:03

## 2023-03-10 ASSESSMENT — ACTIVITIES OF DAILY LIVING (ADL)
ADLS_ACUITY_SCORE: 18
FALL_HISTORY_WITHIN_LAST_SIX_MONTHS: NO
ADLS_ACUITY_SCORE: 18
CONCENTRATING,_REMEMBERING_OR_MAKING_DECISIONS_DIFFICULTY: NO
HEARING_DIFFICULTY_OR_DEAF: NO
ADLS_ACUITY_SCORE: 18
TOILETING_ISSUES: NO
ADLS_ACUITY_SCORE: 18
DRESSING/BATHING_DIFFICULTY: NO
DIFFICULTY_EATING/SWALLOWING: NO
CHANGE_IN_FUNCTIONAL_STATUS_SINCE_ONSET_OF_CURRENT_ILLNESS/INJURY: NO
WALKING_OR_CLIMBING_STAIRS_DIFFICULTY: NO
WEAR_GLASSES_OR_BLIND: NO
ADLS_ACUITY_SCORE: 18
DIFFICULTY_COMMUNICATING: NO
ADLS_ACUITY_SCORE: 18
ADLS_ACUITY_SCORE: 18
ADLS_ACUITY_SCORE: 31
ADLS_ACUITY_SCORE: 18
DOING_ERRANDS_INDEPENDENTLY_DIFFICULTY: NO

## 2023-03-10 NOTE — ANESTHESIA PROCEDURE NOTES
Epidural catheter Procedure Note    Pre-Procedure   Staff -        Anesthesiologist:  Misti Griggs MD       Performed By: anesthesiologist       Location: OB       Procedure Start/Stop Times: 3/10/2023 10:33 AM and 3/10/2023 10:56 AM       Pre-Anesthestic Checklist: patient identified, IV checked, risks and benefits discussed, informed consent, monitors and equipment checked, pre-op evaluation, at physician/surgeon's request and post-op pain management  Timeout:       Correct Patient: Yes        Correct Procedure: Yes        Correct Site: Yes        Correct Position: Yes   Procedure Documentation  Procedure: epidural catheter       Patient Position: sitting       Skin prep: Chloraprep       Insertion Site: L2-3. (midline approach).       Technique: LORT saline        ANGELIA at 6.5 cm.       Needle Gauge: 20.        Needle Length (Inches): 3.5           Catheter threaded easily.           Threaded 12 cm at skin.         # of attempts: 1 and  # of redirects:     Assessment/Narrative         Paresthesias: No.       Test dose of 3 mL lidocaine 1.5% w/ 1:200,000 epinephrine at.         Test dose negative, 3 minutes after injection, for signs of intravascular, subdural, or intrathecal injection.       Insertion/Infusion Method: LORT saline       Aspiration negative for Heme or CSF via Epidural Catheter.    Medication(s) Administered   0.25% Bupivacaine PF (Epidural) - EPIDURAL   5 mL - 3/10/2023 10:53:00 AM  2% Lidocaine w/ 1:200K Epi (EPIDURAL) - EPIDURAL   3 mL - 3/10/2023 10:51:00 AM  Medication Administration Time: 3/10/2023 10:33 AM     Comments:  Initial attempt at L4-5 with easy ANGELIA and easy catheter threading. Aspiration positive for heme however so catheter was withdrawn. Second attempt one level higher again with easy ANGELIA and easy catheter threading. Negative aspiration and negative test dose. No complications.       FOR [facility] ([facility]) ONLY:   Pain Team Contact information: please page the Pain Team  "[facility]. Search \"Pain\". During daytime hours, please page the attending first. At night please page the resident first.    "

## 2023-03-10 NOTE — PLAN OF CARE
Problem: Plan of Care - These are the overarching goals to be used throughout the patient stay.    Goal: Plan of Care Review  Description: The Plan of Care Review/Shift note should be completed every shift.  The Outcome Evaluation is a brief statement about your assessment that the patient is improving, declining, or no change.  This information will be displayed automatically on your shift note.  Outcome: Progressing       Patient started on Cytotec. Here with high Bps and symptotic. At this time no meds needed to be given for Bps. Patient has emesis frequent and did every day of her pregnancy. See Mar for what was given. Per patient she did not want reglan due to how she reacted last time she received it.

## 2023-03-10 NOTE — PROGRESS NOTES
"Pt arrived to triage with spouse for rule out preeclampsia. Pt reports \"feeling like her BP is high\", headache, numbness/heaviness in legs causing a fear of falling.   , 38w3d. Hx anxiety taking effexor and intermittently high Bps recently in pregnancy.   FHR category I. Linda irregularly, reported as mild back pain. +FM. Denies VB and LOF.   Dr. Burks notified of pt arrival and assessment findings. Orders received for HELLP labs and serial Bps.   "

## 2023-03-10 NOTE — ANESTHESIA PREPROCEDURE EVALUATION
Anesthesia Pre-Procedure Evaluation    Patient: Stefany Slater   MRN: [id number] : 1995        Procedure :           Past Medical History:   Diagnosis Date     Anxiety       Past Surgical History:   Procedure Laterality Date     TONSILLECTOMY Bilateral 2013      Allergies   Allergen Reactions     Nuts       Social History     Tobacco Use     Smoking status: Never     Passive exposure: Never     Smokeless tobacco: Never   Substance Use Topics     Alcohol use: Not Currently      Wt Readings from Last 1 Encounters:   23 88 kg (194 lb)        Anesthesia Evaluation            ROS/MED HX  ENT/Pulmonary:  - neg pulmonary ROS     Neurologic:       Cardiovascular:  - neg cardiovascular ROS     METS/Exercise Tolerance:     Hematologic:  - neg hematologic  ROS     Musculoskeletal:       GI/Hepatic:       Renal/Genitourinary:       Endo:       Psychiatric/Substance Use:     (+) psychiatric history anxiety     Infectious Disease:       Malignancy:       Other:               OUTSIDE LABS:  CBC:   Lab Results   Component Value Date    WBC 8.4 03/10/2023    WBC 9.1 2023    HGB 10.2 (L) 03/10/2023    HGB 9.5 (L) 2023    HCT 31.8 (L) 03/10/2023    HCT 29.5 (L) 2023     03/10/2023     2023     BMP:   Lab Results   Component Value Date     03/10/2023    POTASSIUM 4.3 03/10/2023    CHLORIDE 107 03/10/2023    CO2 24 03/10/2023    BUN 5 (L) 03/10/2023    CR 0.55 (L) 03/10/2023    GLC 75 03/10/2023     COAGS: No results found for: PTT, INR, FIBR  POC: No results found for: BGM, HCG, HCGS  HEPATIC:   Lab Results   Component Value Date    ALT <9 03/10/2023    AST 15 03/10/2023     OTHER:   Lab Results   Component Value Date    KD 9.9 03/10/2023       Anesthesia Plan    ASA Status:  2      Anesthesia Type: Epidural.              Consents    Anesthesia Plan(s) and associated risks, benefits, and realistic alternatives discussed. Questions answered and patient/representative(s)  expressed understanding.    - Discussed:     - Discussed with:  Patient, Spouse         Postoperative Care            Comments:    Other Comments: Patient requests labor epidural. Chart reviewed, including labs. Reviewed options and risks with the patient, including but not limited to: bleeding, infection, damage to tissues under the skin(nerves, muscles, blood vessels), hypotension, headache, and epidural failure. Questions answered, consent signed. Patient agrees to elective labor epidural.             Misti Griggs MD

## 2023-03-10 NOTE — H&P
[facility] Labor and Delivery History and Physical    Stefany Slater MRN# [id number]   Age: 27 year old YOB: 1995     Date of Admission:  3/9/2023    Primary care provider: Rita Hines           Chief Complaint:   Stefany Slater is a 27 year old  1 female who is 38w3d pregnant and being admitted for hypertension.  She admits to headache and some blurred vision but not seeing spots.  She denies any epigastric pain.  She is having some contractions that are mild and felt more in her lower back.  She denies any vaginal bleeding or leakage of fluid.  She endorses good fetal movement.  She has not been diagnosed with hypertensive disease in the pregnancy so far..          Pregnancy history:     OBSTETRIC HISTORY:    OB History    Para Term  AB Living   1 0 0 0 0 0   SAB IAB Ectopic Multiple Live Births   0 0 0 0 0      # Outcome Date GA Lbr Alex/2nd Weight Sex Delivery Anes PTL Lv   1 Current                EDC: Estimated Date of Delivery: 3/21/23    Prenatal Labs:   Lab Results   Component Value Date    AS Positive (A) 03/10/2023    HEPBANG Nonreactive 2022    HGB 10.2 (L) 03/10/2023       GBS Status:   No results found for: GBS    Active Problem List  Patient Active Problem List   Diagnosis     Hyperemesis gravidarum     Hypertension complicating pregnancy       Medication Prior to Admission  Medications Prior to Admission   Medication Sig Dispense Refill Last Dose     venlafaxine (EFFEXOR) 37.5 MG tablet Take 37.5 mg by mouth once   3/9/2023     doxylamine (UNISOM) 25 MG TABS tablet Take 25 mg by mouth At Bedtime        ferrous sulfate (FEROSUL) 325 (65 Fe) MG tablet Take 325 mg by mouth daily (with breakfast)        Pyridoxine HCl (VITAMIN B6) 250 MG TABS       .        Maternal Past Medical History:     Past Medical History:   Diagnosis Date     Anxiety                        Family History:   This patient has no significant family history             Social History:   This patient has no significant social history         Review of Systems:   CONSTITUTIONAL: NEGATIVE for fever, chills, change in weight  ENT/MOUTH: NEGATIVE for ear, mouth and throat problems  RESP: NEGATIVE for significant cough or SOB  CV: NEGATIVE for chest pain, palpitations or peripheral edema          Physical Exam:   Vitals were reviewed  Temp: 98.4  F (36.9  C) Temp src: Oral BP: (!) 148/80 Pulse: 99   Resp: 16 SpO2: 99 %      Lungs:   No increased work of breathing, good air exchange, clear to auscultation bilaterally, no crackles or wheezing     Cardiovascular:   regular rate and rhythm     Abdomen:   No scars, normal bowel sounds, soft, non-distended, non-tender, no masses palpated, no hepatosplenomegally     Neurologic:   Deep Tendon Reflexes:  Right Knee:  1+  Left Knee:  1+      Cervix:   Membranes: intact   Dilation: 2   Effacement: 30%   Station:-3   Consistency: firm   Position: Mid  Presentation:Vertex  Fetal Heart Rate Tracins and category 1  Tocometer: frequency q 5 minutes and mild  Platelet: 227  AST: 15  ALT: Less than 9  Protein creatinine ratio: 0.18                     Assessment:   Stefany Slater is a 38w3d pregnant female admitted with gestational hypertension with a few severe range blood pressures.          Plan:   As she is 38 weeks I recommend proceeding with ripening the cervix and the induction of labor.  Risks and benefits were reviewed including slightly higher risk of  rate.  I also discussed the risk of worsening of the hypertensive disease with the severe sequelae of this.  All questions were answered.  She verbalized understanding the above and agrees with the plan.  The plan will be to ripen her with Cytotec tonight and then moved to Pitocin once her Lomas score improves.  We will treat her with nifedipine if she has any more severe level blood pressures.  We will start magnesium only if she has persistent severe level blood  pressures.  Alex Burks MD

## 2023-03-10 NOTE — PROGRESS NOTES
HELLP labs resulted, all WNL. BP range in 140s-160s/80s-100s--no consecutive severe range Bps. Pt still report severe H/A and nausea. Dr. Burks at bedside to assess pt. Orders received to admit. Discussed with pt plan to ripen cervix.

## 2023-03-11 LAB
ABO/RH(D): NORMAL
APTT PPP: 24 SECONDS (ref 22–38)
APTT PPP: 25 SECONDS (ref 22–38)
APTT PPP: 26 SECONDS (ref 22–38)
APTT PPP: 27 SECONDS (ref 22–38)
BLD PROD TYP BPU: NORMAL
BLD PROD TYP BPU: NORMAL
BLOOD COMPONENT TYPE: NORMAL
BLOOD COMPONENT TYPE: NORMAL
CODING SYSTEM: NORMAL
CODING SYSTEM: NORMAL
CROSSMATCH: NORMAL
CROSSMATCH: NORMAL
D DIMER PPP FEU-MCNC: 2.23 UG/ML FEU (ref 0–0.5)
D DIMER PPP FEU-MCNC: 2.44 UG/ML FEU (ref 0–0.5)
D DIMER PPP FEU-MCNC: 3.19 UG/ML FEU (ref 0–0.5)
D DIMER PPP FEU-MCNC: 5.77 UG/ML FEU (ref 0–0.5)
ERYTHROCYTE [DISTWIDTH] IN BLOOD BY AUTOMATED COUNT: 15.2 % (ref 10–15)
ERYTHROCYTE [DISTWIDTH] IN BLOOD BY AUTOMATED COUNT: 15.2 % (ref 10–15)
ERYTHROCYTE [DISTWIDTH] IN BLOOD BY AUTOMATED COUNT: 15.3 % (ref 10–15)
ERYTHROCYTE [DISTWIDTH] IN BLOOD BY AUTOMATED COUNT: 15.7 % (ref 10–15)
FETAL BLEED SCREEN: NORMAL
FIBRINOGEN PPP-MCNC: 371 MG/DL (ref 170–490)
FIBRINOGEN PPP-MCNC: 381 MG/DL (ref 170–490)
FIBRINOGEN PPP-MCNC: 419 MG/DL (ref 170–490)
FIBRINOGEN PPP-MCNC: 493 MG/DL (ref 170–490)
HCT VFR BLD AUTO: 25.1 % (ref 35–47)
HCT VFR BLD AUTO: 25.7 % (ref 35–47)
HCT VFR BLD AUTO: 26.3 % (ref 35–47)
HCT VFR BLD AUTO: 27.5 % (ref 35–47)
HGB BLD-MCNC: 8.2 G/DL (ref 11.7–15.7)
HGB BLD-MCNC: 8.4 G/DL (ref 11.7–15.7)
HGB BLD-MCNC: 8.5 G/DL (ref 11.7–15.7)
HGB BLD-MCNC: 8.6 G/DL (ref 11.7–15.7)
HGB BLD-MCNC: 9.3 G/DL (ref 11.7–15.7)
HOLD SPECIMEN: NORMAL
INR PPP: 0.99 (ref 0.85–1.15)
INR PPP: 1.05 (ref 0.85–1.15)
INR PPP: 1.1 (ref 0.85–1.15)
INR PPP: 1.1 (ref 0.85–1.15)
ISSUE DATE AND TIME: NORMAL
ISSUE DATE AND TIME: NORMAL
MCH RBC QN AUTO: 27.1 PG (ref 26.5–33)
MCH RBC QN AUTO: 27.8 PG (ref 26.5–33)
MCH RBC QN AUTO: 27.9 PG (ref 26.5–33)
MCH RBC QN AUTO: 28.1 PG (ref 26.5–33)
MCHC RBC AUTO-ENTMCNC: 31.2 G/DL (ref 31.5–36.5)
MCHC RBC AUTO-ENTMCNC: 33.1 G/DL (ref 31.5–36.5)
MCHC RBC AUTO-ENTMCNC: 33.5 G/DL (ref 31.5–36.5)
MCHC RBC AUTO-ENTMCNC: 33.8 G/DL (ref 31.5–36.5)
MCV RBC AUTO: 83 FL (ref 78–100)
MCV RBC AUTO: 83 FL (ref 78–100)
MCV RBC AUTO: 84 FL (ref 78–100)
MCV RBC AUTO: 87 FL (ref 78–100)
PLATELET # BLD AUTO: 178 10E3/UL (ref 150–450)
PLATELET # BLD AUTO: 190 10E3/UL (ref 150–450)
PLATELET # BLD AUTO: 248 10E3/UL (ref 150–450)
PLATELET # BLD AUTO: 248 10E3/UL (ref 150–450)
RBC # BLD AUTO: 3.01 10E6/UL (ref 3.8–5.2)
RBC # BLD AUTO: 3.03 10E6/UL (ref 3.8–5.2)
RBC # BLD AUTO: 3.06 10E6/UL (ref 3.8–5.2)
RBC # BLD AUTO: 3.31 10E6/UL (ref 3.8–5.2)
SPECIMEN EXPIRATION DATE: NORMAL
UNIT ABO/RH: NORMAL
UNIT ABO/RH: NORMAL
UNIT NUMBER: NORMAL
UNIT NUMBER: NORMAL
UNIT STATUS: NORMAL
UNIT STATUS: NORMAL
UNIT TYPE ISBT: 9500
UNIT TYPE ISBT: 9500
WBC # BLD AUTO: 16.3 10E3/UL (ref 4–11)
WBC # BLD AUTO: 17.8 10E3/UL (ref 4–11)
WBC # BLD AUTO: 19 10E3/UL (ref 4–11)
WBC # BLD AUTO: 19.7 10E3/UL (ref 4–11)

## 2023-03-11 PROCEDURE — 85027 COMPLETE CBC AUTOMATED: CPT | Performed by: OBSTETRICS & GYNECOLOGY

## 2023-03-11 PROCEDURE — 250N000013 HC RX MED GY IP 250 OP 250 PS 637: Performed by: OBSTETRICS & GYNECOLOGY

## 2023-03-11 PROCEDURE — 250N000013 HC RX MED GY IP 250 OP 250 PS 637: Performed by: STUDENT IN AN ORGANIZED HEALTH CARE EDUCATION/TRAINING PROGRAM

## 2023-03-11 PROCEDURE — 85610 PROTHROMBIN TIME: CPT | Performed by: OBSTETRICS & GYNECOLOGY

## 2023-03-11 PROCEDURE — 250N000011 HC RX IP 250 OP 636: Performed by: OBSTETRICS & GYNECOLOGY

## 2023-03-11 PROCEDURE — 250N000009 HC RX 250: Performed by: STUDENT IN AN ORGANIZED HEALTH CARE EDUCATION/TRAINING PROGRAM

## 2023-03-11 PROCEDURE — 85384 FIBRINOGEN ACTIVITY: CPT | Performed by: OBSTETRICS & GYNECOLOGY

## 2023-03-11 PROCEDURE — 85461 HEMOGLOBIN FETAL: CPT | Performed by: OBSTETRICS & GYNECOLOGY

## 2023-03-11 PROCEDURE — 120N000001 HC R&B MED SURG/OB

## 2023-03-11 PROCEDURE — 85379 FIBRIN DEGRADATION QUANT: CPT | Performed by: OBSTETRICS & GYNECOLOGY

## 2023-03-11 PROCEDURE — P9016 RBC LEUKOCYTES REDUCED: HCPCS | Performed by: OBSTETRICS & GYNECOLOGY

## 2023-03-11 PROCEDURE — 85730 THROMBOPLASTIN TIME PARTIAL: CPT | Performed by: OBSTETRICS & GYNECOLOGY

## 2023-03-11 PROCEDURE — 250N000009 HC RX 250: Performed by: OBSTETRICS & GYNECOLOGY

## 2023-03-11 PROCEDURE — 36415 COLL VENOUS BLD VENIPUNCTURE: CPT | Performed by: OBSTETRICS & GYNECOLOGY

## 2023-03-11 PROCEDURE — 999N000249 HC STATISTIC C-SECTION ON UNIT

## 2023-03-11 RX ADMIN — Medication 100 ML/HR: at 00:20

## 2023-03-11 RX ADMIN — VENLAFAXINE HYDROCHLORIDE 37.5 MG: 37.5 CAPSULE, EXTENDED RELEASE ORAL at 08:51

## 2023-03-11 RX ADMIN — HUMAN RHO(D) IMMUNE GLOBULIN 300 MCG: 1500 SOLUTION INTRAMUSCULAR; INTRAVENOUS at 02:39

## 2023-03-11 RX ADMIN — ACETAMINOPHEN 650 MG: 325 TABLET ORAL at 03:11

## 2023-03-11 RX ADMIN — DOCUSATE SODIUM 100 MG: 100 CAPSULE, LIQUID FILLED ORAL at 08:51

## 2023-03-11 RX ADMIN — IBUPROFEN 800 MG: 800 TABLET ORAL at 08:51

## 2023-03-11 RX ADMIN — ACETAMINOPHEN 650 MG: 325 TABLET ORAL at 18:40

## 2023-03-11 RX ADMIN — ACETAMINOPHEN 650 MG: 325 TABLET ORAL at 08:51

## 2023-03-11 RX ADMIN — MINERAL OIL, PETROLATUM, PHENYLEPHRINE HCL 1 G: 2.5; 140; 749 OINTMENT TOPICAL at 21:47

## 2023-03-11 RX ADMIN — IBUPROFEN 800 MG: 800 TABLET ORAL at 18:40

## 2023-03-11 RX ADMIN — EPSOM SALT: 1 GRANULE ORAL; TOPICAL at 21:47

## 2023-03-11 ASSESSMENT — ACTIVITIES OF DAILY LIVING (ADL)
ADLS_ACUITY_SCORE: 18

## 2023-03-11 NOTE — ANESTHESIA POSTPROCEDURE EVALUATION
Patient: Stefany Slater    Procedure: * No procedures listed *       Anesthesia Type:  Epidural    Note:  Disposition: Inpatient   Postop Pain Control: Uneventful            Sign Out: Well controlled pain   PONV: No   Neuro/Psych: Uneventful            Sign Out: Acceptable/Baseline neuro status   Airway/Respiratory: Uneventful            Sign Out: Acceptable/Baseline resp. status   CV/Hemodynamics: Uneventful            Sign Out: Acceptable CV status; No obvious hypovolemia; No obvious fluid overload   Other NRE: NONE   DID A NON-ROUTINE EVENT OCCUR? No           Last vitals:  Vitals:    03/11/23 0400 03/11/23 0409 03/11/23 0509   BP: 136/73 124/67 131/76   Pulse:  93 85   Resp:  16 16   Temp:  36.9  C (98.4  F) 36.6  C (97.9  F)   SpO2: 94% 94% 96%       Electronically Signed By: Ravi Godinez MD  March 11, 2023  5:45 AM

## 2023-03-11 NOTE — PROGRESS NOTES
"Postpartum Day 1: , PPH, Uterine Atony      Subjective:  The patient feels well. The patient has no emotional concerns. Pain is well controlled with current medications. The patient is ambulating well. She is voiding and passing gas.The amount and color of the lochia is appropriate for the duration of recovery, patient denies clots. The baby is well in the NICU with breathing issues.     Objective   The patient has a blood pressure which is within the normal range. Urinary output is adequate.     Exam:   /81   Pulse 85   Temp 97.9  F (36.6  C) (Oral)   Resp 16   Ht 1.778 m (5' 10\")   Wt 88 kg (194 lb)   SpO2 96%   Breastfeeding Unknown   BMI 27.84 kg/m    General: NAD  Abdomen: soft, NT  Fundus: firm, nontender  Ext: no pain       Impression:   Normal postpartum course.   PPD 1  PPH uterine atony, s/p PRBCs  Hemoglobin stable    Plan:   - Continue current care.  - Doing well  - Likely home tomorrow    Santo Nunez M.D., PITER    "

## 2023-03-11 NOTE — L&D DELIVERY NOTE
Delivery Summary    Stefany Slater MRN# [id number]   Age: 27 year old YOB: 1995     ASSESSMENT & PLAN: ASVD at term,Maria Del Carmen Arora-Stefany [[id number]]    Labor Event Times    Dilation complete date: 3/10/23 Complete time:  5:45 PM   Start pushing date/time: 3/10/2023 1745      Labor Length    2nd Stage (hrs): 4 (min): 1   3rd Stage (hrs): 0 (min): 4      Labor Events     labor?: No   steroids: None  Labor Type: Induction/Cervical ripening  Predominate monitoring during 1st stage: intermittent electronic fetal monitoring     Antibiotics received during labor?: No     Rupture date/time: 3/10/23 1116   Rupture type: Spontaneous rupture of membranes occuring during spontaneous labor or augmentation  Fluid color: Clear  Fluid odor: Normal     Induction: Gel, Oxytocin  Induction date/time:     Cervical ripening date/time:     Indications for induction: Hypertension     1:1 continuous labor support provided by?: RN Labor partogram used?: no      Delivery/Placenta Date and Time    Delivery Date: 3/10/23 Delivery Time:  9:46 PM   Placenta Date/Time: 3/10/2023  9:50 PM   Other personnel present at delivery:  Provider Role   Gaurang Hammond MD          Vaginal Counts     Initial count performed by 2 team members:  Two Team Members   denis early Suture Needles Sponges (RETIRED) Instruments   Initial counts       Added to count       Relief counts       Final counts             Placed during labor Accounted for at the end of labor   FSE No NA   IUPC No NA   Cervidil No NA                     Apgars    Living status: Living   1 Minute 5 Minute 10 Minute 15 Minute 20 Minute   Skin color:  1  2      Heart rate:  2  2      Reflex irritability:  1  1      Muscle tone:  2  2      Respiratory effort:  2  2      Total:  8  9      Apgars assigned by: LUIS WEST     Cord    Cord Complications: None               Stem cell collection?: No       [place] Resuscitation   "  Methods: Oximetry, Norris Puff  [place] Care at Delivery: Requested by Dr. Hammond to attend the delivery of this term, male infant with a gestational age of 38 3/7 weeks secondary to delayed transition.      Infant delivered at 2146 hours on 3/10/2023.  NICU team called at 3 min of age for delayed transition and decreased tone. The infant was stimulated, cried and had spontaneous respirations on the warmer when NICU team arrived.  Infant with shallow spontaneous respirations noted.  Pulse ox placed on right wrist and O2 saturations at 4 min were noted to be 75%.  Infant with decreased breath sounds upon auscultation, CPAP +5 in 21% O2 was given for 2 min.  At 7 min of age O2 saturations noted to be > 85%.  CPAP removed at 9 min of age, infant in room air with no further issues.  O2 saturations in room air are > 95%.  Infant required no further resuscitation. Apgars were 8 at five minute and 9 at ten minutes of age. Gross physical exam is WNL. Infant was shown to mother and father, handoff given to nursery nurse, and will be transferred to the  nursery for further care.    This resuscitation and all procedures were performed by this author.    Luz WEST, APBLO 3/10/2023 10:04 PM   Advanced Practice Providers  [facility]       [place] Measurements    Weight: 8 lb 15.2 oz Length: 1' 9.25\"   Head circumference: 35.5 cm       Labor Events and Shoulder Dystocia    Fetal Tracing Prior to Delivery: Category 1  Shoulder dystocia present?: Neg     Delivery (Maternal) (Provider to Complete) (332312)    Episiotomy: None  Perineal lacerations: 2nd    Est. blood loss (mL): 250  Repair suture: 3-0 Vicryl  Number of repair packets: 1  Genital tract inspection done: Pos     Blood Loss  Mother: Stefany Slater SABRINA #[id number]   Start of Mother's Information    Delivery Blood Loss  03/10/23 0946 - 03/10/23 0296    EBL (mL) Hospital Encounter 250 mL    Delivery QBL (mL) " Hospital Encounter 605 mL    Postpartum QBL (mL) Hospital Encounter 826 mL    Total  1681 mL         End of Mother's Information  Mother: Stefany Slater #[id number]          Delivery - Provider to Complete ([id number])    Attempted Delivery Types (Choose all that apply): Spontaneous Vaginal Delivery  Delivery Type (Choose the 1 that will go to the Birth History): Vaginal, Spontaneous                   Other personnel:  Provider Role   Gaurang Hammond MD                 Placenta    Date/Time: 3/10/2023  9:50 PM  Removal: Spontaneous  Disposition: Hospital disposal           Anesthesia    Method: Epidural  Cervical dilation at placement: 4-7                Presentation and Position    Presentation: Vertex    Position: Left Occiput Anterior                 Gaurang Hammond MD

## 2023-03-11 NOTE — PROGRESS NOTES
OB NOTE    PPH of 1600 ml  Cause:Atony  Has responded to evacuation/massage, Pitocin, Cytotec  Hgb 8.2 but appears symptomatic  Hemorrhage protocol initiated  Informed consent obtained for transfusion 2 unit(s) PRBC    Gaurang Hammond MD

## 2023-03-12 VITALS
SYSTOLIC BLOOD PRESSURE: 138 MMHG | TEMPERATURE: 98.5 F | WEIGHT: 194 LBS | DIASTOLIC BLOOD PRESSURE: 80 MMHG | BODY MASS INDEX: 27.77 KG/M2 | HEART RATE: 94 BPM | OXYGEN SATURATION: 94 % | HEIGHT: 70 IN | RESPIRATION RATE: 16 BRPM

## 2023-03-12 PROCEDURE — 250N000013 HC RX MED GY IP 250 OP 250 PS 637: Performed by: STUDENT IN AN ORGANIZED HEALTH CARE EDUCATION/TRAINING PROGRAM

## 2023-03-12 PROCEDURE — 250N000013 HC RX MED GY IP 250 OP 250 PS 637: Performed by: OBSTETRICS & GYNECOLOGY

## 2023-03-12 RX ORDER — ACETAMINOPHEN 325 MG/1
650 TABLET ORAL EVERY 6 HOURS PRN
Qty: 20 TABLET | Refills: 0 | Status: SHIPPED | OUTPATIENT
Start: 2023-03-12

## 2023-03-12 RX ORDER — DOCUSATE SODIUM 100 MG/1
100 CAPSULE, LIQUID FILLED ORAL 2 TIMES DAILY PRN
Qty: 20 CAPSULE | Refills: 0 | Status: ON HOLD | OUTPATIENT
Start: 2023-03-12 | End: 2023-04-03

## 2023-03-12 RX ORDER — IBUPROFEN 800 MG/1
800 TABLET, FILM COATED ORAL EVERY 6 HOURS PRN
Qty: 30 TABLET | Refills: 0 | Status: SHIPPED | OUTPATIENT
Start: 2023-03-12

## 2023-03-12 RX ADMIN — HYDROCORTISONE: 25 CREAM TOPICAL at 13:50

## 2023-03-12 RX ADMIN — ACETAMINOPHEN 650 MG: 325 TABLET ORAL at 07:04

## 2023-03-12 RX ADMIN — HYDROCORTISONE: 25 CREAM TOPICAL at 13:49

## 2023-03-12 RX ADMIN — BENZOCAINE AND LEVOMENTHOL: 200; 5 SPRAY TOPICAL at 14:02

## 2023-03-12 RX ADMIN — IBUPROFEN 800 MG: 800 TABLET ORAL at 14:02

## 2023-03-12 RX ADMIN — ACETAMINOPHEN 650 MG: 325 TABLET ORAL at 01:07

## 2023-03-12 RX ADMIN — IBUPROFEN 800 MG: 800 TABLET ORAL at 01:07

## 2023-03-12 RX ADMIN — IBUPROFEN 800 MG: 800 TABLET ORAL at 07:04

## 2023-03-12 RX ADMIN — VENLAFAXINE HYDROCHLORIDE 37.5 MG: 37.5 CAPSULE, EXTENDED RELEASE ORAL at 09:40

## 2023-03-12 ASSESSMENT — ACTIVITIES OF DAILY LIVING (ADL)
ADLS_ACUITY_SCORE: 18

## 2023-03-12 NOTE — PROGRESS NOTES
Progress Note    Doing well. No complaints. No PIH symptoms. Denies dizziness or lightheadedness with standing. Normal lochia. Tolerating PO. Ambulating. Voiding.     Temp:  [97.7  F (36.5  C)-98.1  F (36.7  C)] 97.7  F (36.5  C)  Pulse:  [] 76  Resp:  [16] 16  BP: (118-133)/(70-77) 133/74  SpO2:  [92 %-97 %] 94 %    NAD, AAO x 3  Abdomen soft, non tender  Uterus firm, below umbilicus  No c/c/e    Hgb: 10.2 --> 8.2 --> 8.5 --> 8.4 --> 8.6 --> 9.3    A/P: 28 yo PPD #2 s/p  with 1600 ml EBL  -- PPH with uterine atony. Symptomatic acute blood loss anemia - S/p RBCs. Doing well. Now asymptomatic.  -- Routine cares  -- Discharge home today    Yris Krause MD, FACOG  P) [phone]

## 2023-03-12 NOTE — PLAN OF CARE
Problem: Postpartum (Vaginal Delivery)  Goal: Optimal Pain Control and Function  Outcome: Progressing  Intervention: Prevent or Manage Pain     Vital signs WNL. Patient reports perineum and rectal pain/pressure especially with ambulation. Right labia swollen, moderate sized edematous hemorrhoid also present. Mag Sulfate packs and hemorrhoid cream applied, ice encouraged. Improvement already noted. Encouraged tub soak, patient declined. Tylenol and Ibuprofen given every 6 hours.    Problem: Postpartum (Vaginal Delivery)  Goal: Successful Maternal Role Transition  Outcome: Progressing    Patient up to [facility] x 3 this shift. Patient up independently, no reports of lightheaded or dizziness. Mom pumping independently in her room. Encouraged pumping every three hours today.

## 2023-03-12 NOTE — PLAN OF CARE
Care plan is complete.  Pt is declining an MMR or extra lab draw.  Discharging home.  Baby will remain in special care.

## 2023-03-12 NOTE — LACTATION NOTE
"This note was copied from a baby's chart.  Rounded on family for lactation support per nursing and patient request.  Shahbaz is the first born for Papo. Shahbaz is being treated for hyperbilirubinemia with a TOSIN+2 in [facility].  Stefany has been using the symphony breast pump in her room for breast milk stimulation.  She has a Spectra pump for home use.      Educated/reviewed hand expression using \"press, compress and release\".  Mom had great success. Assisted Stefany and Shahbaz to achieve a latch in a football position. Shahbaz was resistant to latch and was very fussy.  Breaks provided for calming and burping however a sustained latch was not achieved.  Side lying paced bottle feeding educated and demonstrated.    Education continued in the mother's room.    Directed mom to hand expression video and breastfeeding support on [employer]. for home reference.  Reviewed \"Breastfeeding Essentials\" resource for photo prompts of the \"flipple\" technique for a deep asymmetrical latch, QR codes for global health media and achieving a deep latch.     Educated/reviewed milk production of supply and demand.  Encouraged mom to breastfeed on demand with a goal of 8-12 feedings per day to help milk production. Stefany received 2 units of blood following a pph. Reviewed expectation of full milk arriving by 3-5 days of life and will benefit from use of the electric breastpump.     Educated/reviewed signs of milk transfer with gentle tug at the breast, audible swallows and wet and soiled diapers per the education folder I & O.     Reviewed use of education folder for self learning, lactation and community support, indicators to call MD and maternal/family well being.    Provided education on use of the Spectra breastpump with written info, QR code and hands on use.    Questions encouraged and addressed.    Nicole Crowell RNC, IBCLC          "

## 2023-03-12 NOTE — DISCHARGE SUMMARY
HOSPITAL DISCHARGE SUMMARY - VAGINAL BIRTH    Patient Name: Stefany Slater   YOB: 1995  Age: 27 year old  Medical Record Number: [id number]  Primary Physician: Rita Hines    Admission Date:  3/9/2023  Delivery Date:    Gestational Age at Delivery:  38w 3d   Discharge Date:  3/12/2023    REASON FOR ADMISSION: Labor and Delivery    DIAGNOSIS:    1. IUP  27 year old  female delivery at 38w 3d   2. APGARS at 1 min 5, at 5 min 8, and 9 at 10 minutes  3. Baby's Weight 8 lbs 15. 2 oz    PROCEDURE:      CONDITIONS COMPLICATING ANTEPARTUM/POSTPARTUM:   Antepartum: GHTN  Intrapartum: None  Postpartum: post partum hemorrhage - Transfusion RBCs    SIGNIFICANT DIAGNOSTIC PROCEDURES:   None    CONSULTS:   None    HISTORY OF PRESENT ILLNESS AND HOSPITAL COURSE: This is a 27 year old  female who was admitted for induction / GHTN. She underwent a  Normal spontaneous vaginal delivery. She had a post partum hemorrhage with a 1600 ml EBL. She did receive a transfusion. Post partum she has done well. On the day that she was discharged, vital signs were stable. She is asymptomatic from the anemia. Her pain was controlled, she was tolerating diet. She was voiding and passing gas.     LABS:  Hgb: 10.2 --> 8.2 --> 8.5 --> 8.4 --> 8.6 --> 9.3    PENDING LABS:  None    DISPOSITION:  Home    CONDITION AT DISCHARGE:  Good/Stable    Discharge Medications:      Review of your medicines      START taking      Dose / Directions   acetaminophen 325 MG tablet  Commonly known as: TYLENOL  Used for:  (normal spontaneous vaginal delivery)      Dose: 650 mg  Take 2 tablets (650 mg) by mouth every 6 hours as needed for mild pain or fever  Quantity: 20 tablet  Refills: 0     docusate sodium 100 MG capsule  Commonly known as: COLACE  Used for:  (normal spontaneous vaginal delivery)      Dose: 100 mg  Take 1 capsule (100 mg) by mouth 2 times daily as needed for constipation  Quantity: 20 capsule  Refills:  0     ibuprofen 800 MG tablet  Commonly known as: ADVIL/MOTRIN  Used for:  (normal spontaneous vaginal delivery)      Dose: 800 mg  Take 1 tablet (800 mg) by mouth every 6 hours as needed for other (cramping)  Quantity: 30 tablet  Refills: 0        CONTINUE these medicines which have NOT CHANGED      Dose / Directions   ferrous sulfate 325 (65 Fe) MG tablet  Commonly known as: FEROSUL      Dose: 325 mg  Take 325 mg by mouth daily (with breakfast)  Refills: 0     venlafaxine 37.5 MG 24 hr capsule  Commonly known as: EFFEXOR XR      Dose: 37.5 mg  Take 37.5 mg by mouth daily  Refills: 0        STOP taking    doxylamine 25 MG Tabs tablet  Commonly known as: UNISOM        Vitamin B6 250 MG Tabs              Where to get your medicines      Some of these will need a paper prescription and others can be bought over the counter. Ask your nurse if you have questions.    Bring a paper prescription for each of these medications    acetaminophen 325 MG tablet    docusate sodium 100 MG capsule    ibuprofen 800 MG tablet       DISCHARGE PLAN:   - Follow up with Dr. Burks, in 6 weeks  - Take medication as prescribed  - Physical activity: As tolerated, no heavy lifting. Pelvic rest.  - Diet:  Regular  - Medication:  Please see MAR  - Warning signs discussed with patient about when to call the clinic/hospital  - All questions and concerns were answered for the patient prior to discharge.     Yris Krause MD, FACOG  P) [phone]    I saw the patient on the date of discharge  Total time spent for discharge on date of discharge: 20 minutes    Physician(s) in addition to primary physician who should receive a copy:  CC1: Dr. Burks

## 2023-03-13 ENCOUNTER — LACTATION ENCOUNTER (OUTPATIENT)
Age: 28
End: 2023-03-13

## 2023-03-13 NOTE — LACTATION NOTE
This note was copied from a baby's chart.  Referred to Stefany to assist with a feeding. She reported that she had used her Spectra pump last night and was not able to obtain colostrum. She was interested in tips on how to get more milk.     With a gloved finger, a suck assessment was done with Shahbaz. It was noted that he had a tighter suck and was not keeping his tongue over the gumline. Because fo this and the fact that he had not been latching well, some body stretches were shown to parents. These included a full body stretch, sacral balance and occipital. While demonstrating the occipital stretch , it was noted that Shahbaz was definitely tight in his neck and this area was massaged gently.    Using a [employer] pillow, a latch was attempted on the L breast in a cross cradle hold. Shahbaz quickly got frustrated with the latch and at this point, a NS 24mm was added to the L nipple. He was calmed by glazing the NS with PDM and then sucking on the bottle of milk for a few suck/swallows. Shahbaz would then suck on the shield but his tongue was not visible at the gumline yet. This was repeated three times and then Ramirez was shown how to pace feed and this was done with the remainder of the PDM.    Stefany then was shown how to get more milk with her Spectra 1 using a variety of speeds and pressures. She was able to pump about 25mls in 15minutes. Stefany to also purchase flanges 21mm for a better fit.    To continue to work with family while inpt.

## 2023-03-14 ENCOUNTER — LACTATION ENCOUNTER (OUTPATIENT)
Age: 28
End: 2023-03-14

## 2023-03-14 NOTE — LACTATION NOTE
This note was copied from a baby's chart.  A feeding was not observed today but Stefany stated that she was getting more milk with the Spectra.    Resource sheets were given to parents for further assessment by cranial sacral therapists and ENT/Dentist as needed after discharge of Shahbaz.

## 2023-03-15 ENCOUNTER — LACTATION ENCOUNTER (OUTPATIENT)
Age: 28
End: 2023-03-15

## 2023-03-15 NOTE — LACTATION NOTE
This note was copied from a baby's chart.  IBCLC referred to family to assist with feeding.     Mother reports infant has never had more than a few sucks at the breast. Oral assessment performed, minimal cupping, suck is moderate in strength, only lateralized to the right, tongue only past gum line part of the time. Recommended suck training pad side up with gentle tugging and downward pressure to encourage cupping and drawing tongue forward.     Infant positioned in L football hold. Able to latch with 20mm NS with NS dramatically angled towards the nose. Encouraged continuous breast compression until SABRINA and filling the shield via HE prior to latching. Utilized EBM with a 1cc syringe to keep infant interested in first few minutes. Bursts of 1-2 minutes with active suck/swallow ratio. About 7 mintues total of active nursing (latched for 15min). Unable to maintain wide gape or bottom lip unable to remain flanged despite proper positioning. Reiterated proper positioning and how to know infant is getting enough to eat. Discussed way to transition to more feedings at breast (bottle breast bottle, breast bottle, SNS, feeding infant with early feeding signs).     Encouraged outpatient CST to address body tension, outpatient lactation follow up and given tight frenulum resources.     Lactation will continue to follow while infant is an inpatient.

## 2023-03-16 ENCOUNTER — LACTATION ENCOUNTER (OUTPATIENT)
Age: 28
End: 2023-03-16

## 2023-03-16 NOTE — LACTATION NOTE
This note was copied from a baby's chart.  Rounded on mom and baby for lactation follow up per patient request.    Stefany is pumping for the baby in SCN with some intermittent breastfeeding attempts.  She is concerned about a plugged duct.  LC appreciated a swollen duct above Stefany's left breast.  LC encouraged warm compresses with gentle massage especially with pumping.  Reviewed lactogenesis, breast pumping and breastmilk storage.    A copy of Lactation Resources from the education folder provided with outpt support and online support for reference. Stefany was encouraged to follow up at the Outpatient [facility]ation Clinic after discharge for any breastfeeding questions or concerns.    Questions encouraged and addressed.    Nicole Crowell RNC, IBCLC

## 2023-04-02 ENCOUNTER — NURSE TRIAGE (OUTPATIENT)
Dept: NURSING | Facility: CLINIC | Age: 28
End: 2023-04-02
Payer: OTHER GOVERNMENT

## 2023-04-03 ENCOUNTER — APPOINTMENT (OUTPATIENT)
Dept: ULTRASOUND IMAGING | Facility: CLINIC | Age: 28
DRG: 769 | End: 2023-04-03
Attending: EMERGENCY MEDICINE
Payer: OTHER GOVERNMENT

## 2023-04-03 ENCOUNTER — HOSPITAL ENCOUNTER (INPATIENT)
Facility: CLINIC | Age: 28
LOS: 2 days | Discharge: HOME | DRG: 769 | End: 2023-04-05
Attending: EMERGENCY MEDICINE | Admitting: OBSTETRICS & GYNECOLOGY
Payer: OTHER GOVERNMENT

## 2023-04-03 DIAGNOSIS — N61.1 BREAST ABSCESS: Primary | ICD-10-CM

## 2023-04-03 DIAGNOSIS — N61.0 MASTITIS: ICD-10-CM

## 2023-04-03 LAB
ANION GAP SERPL CALCULATED.3IONS-SCNC: 8 MMOL/L (ref 5–18)
BASOPHILS # BLD AUTO: 0 10E3/UL (ref 0–0.2)
BASOPHILS NFR BLD AUTO: 0 %
BUN SERPL-MCNC: 8 MG/DL (ref 8–22)
C REACTIVE PROTEIN LHE: 4.7 MG/DL (ref 0–?)
CALCIUM SERPL-MCNC: 9 MG/DL (ref 8.5–10.5)
CHLORIDE BLD-SCNC: 106 MMOL/L (ref 98–107)
CO2 SERPL-SCNC: 28 MMOL/L (ref 22–31)
CREAT SERPL-MCNC: 0.6 MG/DL (ref 0.6–1.1)
EOSINOPHIL # BLD AUTO: 0.2 10E3/UL (ref 0–0.7)
EOSINOPHIL NFR BLD AUTO: 3 %
ERYTHROCYTE [DISTWIDTH] IN BLOOD BY AUTOMATED COUNT: 17.5 % (ref 10–15)
GFR SERPL CREATININE-BSD FRML MDRD: >90 ML/MIN/1.73M2
GLUCOSE BLD-MCNC: 112 MG/DL (ref 70–125)
HCT VFR BLD AUTO: 29.5 % (ref 35–47)
HGB BLD-MCNC: 9.2 G/DL (ref 11.7–15.7)
IMM GRANULOCYTES # BLD: 0 10E3/UL
IMM GRANULOCYTES NFR BLD: 1 %
LYMPHOCYTES # BLD AUTO: 2.1 10E3/UL (ref 0.8–5.3)
LYMPHOCYTES NFR BLD AUTO: 25 %
MAGNESIUM SERPL-MCNC: 1.7 MG/DL (ref 1.8–2.6)
MCH RBC QN AUTO: 27.3 PG (ref 26.5–33)
MCHC RBC AUTO-ENTMCNC: 31.2 G/DL (ref 31.5–36.5)
MCV RBC AUTO: 88 FL (ref 78–100)
MONOCYTES # BLD AUTO: 0.9 10E3/UL (ref 0–1.3)
MONOCYTES NFR BLD AUTO: 10 %
NEUTROPHILS # BLD AUTO: 5.1 10E3/UL (ref 1.6–8.3)
NEUTROPHILS NFR BLD AUTO: 61 %
NRBC # BLD AUTO: 0 10E3/UL
NRBC BLD AUTO-RTO: 0 /100
PLATELET # BLD AUTO: 295 10E3/UL (ref 150–450)
POTASSIUM BLD-SCNC: 2.4 MMOL/L (ref 3.5–5)
POTASSIUM BLD-SCNC: 3 MMOL/L (ref 3.5–5)
POTASSIUM BLD-SCNC: 3.4 MMOL/L (ref 3.5–5)
POTASSIUM BLD-SCNC: 3.7 MMOL/L (ref 3.5–5)
RBC # BLD AUTO: 3.37 10E6/UL (ref 3.8–5.2)
SODIUM SERPL-SCNC: 142 MMOL/L (ref 136–145)
WBC # BLD AUTO: 8.3 10E3/UL (ref 4–11)

## 2023-04-03 PROCEDURE — 258N000003 HC RX IP 258 OP 636: Performed by: EMERGENCY MEDICINE

## 2023-04-03 PROCEDURE — 85025 COMPLETE CBC W/AUTO DIFF WBC: CPT | Performed by: EMERGENCY MEDICINE

## 2023-04-03 PROCEDURE — 80048 BASIC METABOLIC PNL TOTAL CA: CPT | Performed by: EMERGENCY MEDICINE

## 2023-04-03 PROCEDURE — 120N000001 HC R&B MED SURG/OB

## 2023-04-03 PROCEDURE — 76642 ULTRASOUND BREAST LIMITED: CPT | Mod: RT

## 2023-04-03 PROCEDURE — 250N000011 HC RX IP 250 OP 636: Performed by: EMERGENCY MEDICINE

## 2023-04-03 PROCEDURE — 250N000011 HC RX IP 250 OP 636: Performed by: OBSTETRICS & GYNECOLOGY

## 2023-04-03 PROCEDURE — 99285 EMERGENCY DEPT VISIT HI MDM: CPT | Mod: 25

## 2023-04-03 PROCEDURE — 250N000013 HC RX MED GY IP 250 OP 250 PS 637: Performed by: EMERGENCY MEDICINE

## 2023-04-03 PROCEDURE — 36415 COLL VENOUS BLD VENIPUNCTURE: CPT | Performed by: OBSTETRICS & GYNECOLOGY

## 2023-04-03 PROCEDURE — 83735 ASSAY OF MAGNESIUM: CPT | Performed by: EMERGENCY MEDICINE

## 2023-04-03 PROCEDURE — 258N000003 HC RX IP 258 OP 636: Performed by: OBSTETRICS & GYNECOLOGY

## 2023-04-03 PROCEDURE — 99221 1ST HOSP IP/OBS SF/LOW 40: CPT | Mod: FS | Performed by: SURGERY

## 2023-04-03 PROCEDURE — 250N000013 HC RX MED GY IP 250 OP 250 PS 637: Performed by: OBSTETRICS & GYNECOLOGY

## 2023-04-03 PROCEDURE — 84132 ASSAY OF SERUM POTASSIUM: CPT | Performed by: OBSTETRICS & GYNECOLOGY

## 2023-04-03 PROCEDURE — 86140 C-REACTIVE PROTEIN: CPT | Performed by: EMERGENCY MEDICINE

## 2023-04-03 PROCEDURE — 36415 COLL VENOUS BLD VENIPUNCTURE: CPT | Performed by: EMERGENCY MEDICINE

## 2023-04-03 RX ORDER — CEFAZOLIN SODIUM/WATER 2 G/20 ML
2 SYRINGE (ML) INTRAVENOUS
Status: CANCELLED | OUTPATIENT
Start: 2023-04-04

## 2023-04-03 RX ORDER — POTASSIUM CHLORIDE 1500 MG/1
40 TABLET, EXTENDED RELEASE ORAL ONCE
Status: COMPLETED | OUTPATIENT
Start: 2023-04-03 | End: 2023-04-03

## 2023-04-03 RX ORDER — CEPHALEXIN 500 MG/1
500 CAPSULE ORAL 4 TIMES DAILY
Status: ON HOLD | COMMUNITY
End: 2023-04-05

## 2023-04-03 RX ORDER — HEPARIN SODIUM 5000 [USP'U]/.5ML
5000 INJECTION, SOLUTION INTRAVENOUS; SUBCUTANEOUS
Status: DISCONTINUED | OUTPATIENT
Start: 2023-04-03 | End: 2023-04-03

## 2023-04-03 RX ORDER — VENLAFAXINE HYDROCHLORIDE 37.5 MG/1
37.5 CAPSULE, EXTENDED RELEASE ORAL
Status: DISCONTINUED | OUTPATIENT
Start: 2023-04-03 | End: 2023-04-05 | Stop reason: HOSPADM

## 2023-04-03 RX ORDER — ONDANSETRON 4 MG/1
4 TABLET, ORALLY DISINTEGRATING ORAL EVERY 6 HOURS PRN
Status: DISCONTINUED | OUTPATIENT
Start: 2023-04-03 | End: 2023-04-05 | Stop reason: HOSPADM

## 2023-04-03 RX ORDER — CEFAZOLIN SODIUM 2 G/100ML
2 INJECTION, SOLUTION INTRAVENOUS SEE ADMIN INSTRUCTIONS
Status: DISCONTINUED | OUTPATIENT
Start: 2023-04-03 | End: 2023-04-03

## 2023-04-03 RX ORDER — CEFAZOLIN SODIUM/WATER 2 G/20 ML
2 SYRINGE (ML) INTRAVENOUS SEE ADMIN INSTRUCTIONS
Status: CANCELLED | OUTPATIENT
Start: 2023-04-04

## 2023-04-03 RX ORDER — AMOXICILLIN 250 MG
2 CAPSULE ORAL 2 TIMES DAILY
Status: DISCONTINUED | OUTPATIENT
Start: 2023-04-03 | End: 2023-04-05 | Stop reason: HOSPADM

## 2023-04-03 RX ORDER — AMOXICILLIN 250 MG
1 CAPSULE ORAL 2 TIMES DAILY
Status: DISCONTINUED | OUTPATIENT
Start: 2023-04-03 | End: 2023-04-05 | Stop reason: HOSPADM

## 2023-04-03 RX ORDER — POTASSIUM CHLORIDE 1500 MG/1
20 TABLET, EXTENDED RELEASE ORAL ONCE
Status: COMPLETED | OUTPATIENT
Start: 2023-04-03 | End: 2023-04-03

## 2023-04-03 RX ORDER — CEFTRIAXONE 1 G/1
1 INJECTION, POWDER, FOR SOLUTION INTRAMUSCULAR; INTRAVENOUS ONCE
Status: COMPLETED | OUTPATIENT
Start: 2023-04-03 | End: 2023-04-03

## 2023-04-03 RX ORDER — IBUPROFEN 600 MG/1
600 TABLET, FILM COATED ORAL EVERY 6 HOURS PRN
Status: DISCONTINUED | OUTPATIENT
Start: 2023-04-03 | End: 2023-04-05 | Stop reason: HOSPADM

## 2023-04-03 RX ORDER — ONDANSETRON 2 MG/ML
4 INJECTION INTRAMUSCULAR; INTRAVENOUS EVERY 6 HOURS PRN
Status: DISCONTINUED | OUTPATIENT
Start: 2023-04-03 | End: 2023-04-05 | Stop reason: HOSPADM

## 2023-04-03 RX ORDER — ACETAMINOPHEN 325 MG/1
975 TABLET ORAL EVERY 8 HOURS
Status: DISCONTINUED | OUTPATIENT
Start: 2023-04-03 | End: 2023-04-04

## 2023-04-03 RX ORDER — CEFAZOLIN SODIUM 2 G/100ML
2 INJECTION, SOLUTION INTRAVENOUS
Status: DISCONTINUED | OUTPATIENT
Start: 2023-04-03 | End: 2023-04-03

## 2023-04-03 RX ADMIN — VANCOMYCIN HYDROCHLORIDE 1500 MG: 5 INJECTION, POWDER, LYOPHILIZED, FOR SOLUTION INTRAVENOUS at 04:09

## 2023-04-03 RX ADMIN — POTASSIUM CHLORIDE 20 MEQ: 1500 TABLET, EXTENDED RELEASE ORAL at 10:53

## 2023-04-03 RX ADMIN — POTASSIUM CHLORIDE 40 MEQ: 1500 TABLET, EXTENDED RELEASE ORAL at 16:48

## 2023-04-03 RX ADMIN — CEFTRIAXONE 1 G: 1 INJECTION, POWDER, FOR SOLUTION INTRAMUSCULAR; INTRAVENOUS at 03:34

## 2023-04-03 RX ADMIN — VANCOMYCIN HYDROCHLORIDE 1250 MG: 5 INJECTION, POWDER, LYOPHILIZED, FOR SOLUTION INTRAVENOUS at 16:48

## 2023-04-03 RX ADMIN — VENLAFAXINE HYDROCHLORIDE 37.5 MG: 37.5 CAPSULE, EXTENDED RELEASE ORAL at 21:55

## 2023-04-03 RX ADMIN — ACETAMINOPHEN 975 MG: 325 TABLET ORAL at 12:40

## 2023-04-03 RX ADMIN — ACETAMINOPHEN 975 MG: 325 TABLET ORAL at 21:55

## 2023-04-03 RX ADMIN — POTASSIUM CHLORIDE 40 MEQ: 1500 TABLET, EXTENDED RELEASE ORAL at 08:18

## 2023-04-03 RX ADMIN — IBUPROFEN 600 MG: 600 TABLET ORAL at 05:41

## 2023-04-03 RX ADMIN — SENNOSIDES AND DOCUSATE SODIUM 1 TABLET: 50; 8.6 TABLET ORAL at 21:55

## 2023-04-03 RX ADMIN — POTASSIUM CHLORIDE 40 MEQ: 1500 TABLET, EXTENDED RELEASE ORAL at 02:48

## 2023-04-03 RX ADMIN — SENNOSIDES AND DOCUSATE SODIUM 2 TABLET: 50; 8.6 TABLET ORAL at 08:19

## 2023-04-03 RX ADMIN — ACETAMINOPHEN 975 MG: 325 TABLET ORAL at 05:41

## 2023-04-03 ASSESSMENT — ACTIVITIES OF DAILY LIVING (ADL)
ADLS_ACUITY_SCORE: 35
DRESSING/BATHING_DIFFICULTY: NO
WALKING_OR_CLIMBING_STAIRS_DIFFICULTY: NO
ADLS_ACUITY_SCORE: 18
DIFFICULTY_EATING/SWALLOWING: NO
TOILETING_ISSUES: NO
CONCENTRATING,_REMEMBERING_OR_MAKING_DECISIONS_DIFFICULTY: NO
CHANGE_IN_FUNCTIONAL_STATUS_SINCE_ONSET_OF_CURRENT_ILLNESS/INJURY: NO
ADLS_ACUITY_SCORE: 35
DOING_ERRANDS_INDEPENDENTLY_DIFFICULTY: NO
WEAR_GLASSES_OR_BLIND: NO
ADLS_ACUITY_SCORE: 18
FALL_HISTORY_WITHIN_LAST_SIX_MONTHS: NO
ADLS_ACUITY_SCORE: 35

## 2023-04-03 NOTE — H&P
[facility]    History and Physical  Obstetrics and Gynecology     Date of Admission:  4/3/2023    Assessment & Plan   Stefany Slater is a 27 year old  s/p  3/10 who presents with worsening mastitis, abscess.    PLAN:   Admit - see IP orders  Vancomycin for mastitis given worsening on Keflex  Likely abscess on US, will get general surgery consult to consider drainage  Hypokalemia - replaced in ED, replacement protocol ordered with repeat lab here.  Lactation consult    Pau Hines MD, FACOG, [facility]  4/3/2023 5:26 AM          History of Present Illness   Stefany Slater is a 27 year old  s/p  3/10 who presents with worsening mastitis. Went to the urgency room a couple days ago and was given a prescription for Keflex. She states that it did not help, in fact her symptoms became worse since that time. She has tried several home remedies including massage, hot packing, continuing to empty her breasts. Her breast is increasingly red, firm and painful. Worse when she walks/moves.      Past Medical History    I have reviewed this patient's medical history and updated it with pertinent information if needed.   Past Medical History:   Diagnosis Date     Anxiety        Past Surgical History   I have reviewed this patient's surgical history and updated it with pertinent information if needed.  Past Surgical History:   Procedure Laterality Date     TONSILLECTOMY Bilateral        Prior to Admission Medications   Prior to Admission Medications   Prescriptions Last Dose Informant Patient Reported? Taking?   acetaminophen (TYLENOL) 325 MG tablet   No No   Sig: Take 2 tablets (650 mg) by mouth every 6 hours as needed for mild pain or fever   docusate sodium (COLACE) 100 MG capsule   No No   Sig: Take 1 capsule (100 mg) by mouth 2 times daily as needed for constipation   ferrous sulfate (FEROSUL) 325 (65 Fe) MG tablet   Yes No   Sig: Take 325 mg by mouth daily (with breakfast)    ibuprofen (ADVIL/MOTRIN) 800 MG tablet   No No   Sig: Take 1 tablet (800 mg) by mouth every 6 hours as needed for other (cramping)   venlafaxine (EFFEXOR XR) 37.5 MG 24 hr capsule   Yes No   Sig: Take 37.5 mg by mouth daily      Facility-Administered Medications: None     Allergies   Allergies   Allergen Reactions     Nuts        Social History   I have reviewed this patient's social history and updated it with pertinent information if needed. Stefany Slater  reports that she has never smoked. She has never been exposed to tobacco smoke. She has never used smokeless tobacco. She reports that she does not currently use alcohol. She reports that she does not use drugs.    Family History   I have reviewed this patient's family history and updated it with pertinent information if needed.   History reviewed. No pertinent family history.  Physical Exam   Temp: 98  F (36.7  C) Temp src: Oral BP: (!) 135/90 Pulse: 87   Resp: 18 SpO2: 98 % O2 Device: None (Room air)    Vital Signs with Ranges  Temp:  [98  F (36.7  C)-98.2  F (36.8  C)] 98  F (36.7  C)  Pulse:  [75-88] 87  Resp:  [16-18] 18  BP: (132-144)/(80-97) 135/90  SpO2:  [98 %-99 %] 98 %  Constitutional: healthy, alert  Breast: Erythematous, tender area right upper breast with area of fluctuance 2.5 cm at the 10 o clock position and some induration inferior to the area of fluctuance.   Respiratory: No increased work of breathing, good air exchange.  Cardiovascular: RRR

## 2023-04-03 NOTE — PHARMACY-VANCOMYCIN DOSING SERVICE
Pharmacy Vancomycin Initial Note  Date of Service April 3, 2023  Patient's  1995  27 year old, female    Indication: Skin and Soft Tissue Infection    Current estimated CrCl = Estimated Creatinine Clearance: 167 mL/min (based on SCr of 0.6 mg/dL).    Creatinine for last 3 days  4/3/2023:  1:36 AM Creatinine 0.60 mg/dL    Recent Vancomycin Level(s) for last 3 days  No results found for requested labs within last 3 days.      Vancomycin IV Administrations (past 72 hours)      No vancomycin orders with administrations in past 72 hours.                Nephrotoxins and other renal medications (From now, onward)    Start     Dose/Rate Route Frequency Ordered Stop    23 0330  vancomycin (VANCOCIN) 1,500 mg in 0.9% NaCl 250 mL intermittent infusion         1,500 mg  over 90 Minutes Intravenous ONCE 23 0321            Contrast Orders - past 72 hours (72h ago, onward)    None                Plan:  1. Start vancomycin 1500 mg (20mg/kg) IV x1 in ER.   2. If vancomycin to be continued inpatient please re-order pharmacy consult     Lenard Randle RPH

## 2023-04-03 NOTE — ED TRIAGE NOTES
Patent arrives to the ER for R breast pain. She has been on cephalexin since Saturday for mastitis and per the patient, it has gotten worse.  Has had fevers at home. Last Tylenol and ibuprofen was Sunday morning. 3.5 weeks postpartum.     Triage Assessment     Row Name 04/03/23 0103       Triage Assessment (Adult)    Airway WDL WDL       Respiratory WDL    Respiratory WDL WDL       Cardiac WDL    Cardiac WDL WDL       Peripheral/Neurovascular WDL    Peripheral Neurovascular WDL WDL       Cognitive/Neuro/Behavioral WDL    Cognitive/Neuro/Behavioral WDL WDL

## 2023-04-03 NOTE — CONSULTS
General Surgery Consultation  Stefany Slater MRN# [id number]   Age/Sex: 27 year old female YOB: 1995     Reason for consult: 1. Mastitis            Requesting physician: Dr. Hines                   Assessment and Plan:   Assessment:  Right mastitis with possible small abscess    Plan:  Reviewed exam, lab, and imaging findings with them. Findings consistent with right mastitis with possible small abscess. Discussed indication for I&D. She is fairly apprehensive about doing this at bedside. Since she is hemodynamically stable and there isn't an obvious larger abscess at this point, we can see how things going over the next 24 hours and consider I&D in the OR tomorrow if not making significant improvements. Ok to for diet today, NPO at midnight. Continue IV abx. Continue with warm packs, massage, and pumping as tolerated.       Rikki Sanderson PA-C  [facility]  Surgery [address]  [address]  [place] [address]?  Office: [phone]             Chief Complaint:     Chief Complaint   Patient presents with     Breast Pain        History is obtained from the patient    HPI:   Stefany Slater is a 27 year old female who presents with worsening right breast pain. She presented to the ED for worsening symptoms. She recently had a baby boy about 3 weeks ago. She has been pumping and bottle feeding due to latch issues. She started getting more sore a few days ago, she went to  2 days ago and was started on PO abx. Her symptoms worsened so she presented here. US was done and does show a possible small fluid collection in her right breast. Since she has been here, she notes her pain has somewhat improved and she has been able to express more milk from the breast with pumping. She denies fevers or chills.           Past Medical History:     Past Medical History:   Diagnosis Date     Anxiety               Past Surgical History:     Past Surgical History:  "  Procedure Laterality Date     TONSILLECTOMY Bilateral 2013             Social History:    reports that she has never smoked. She has never been exposed to tobacco smoke. She has never used smokeless tobacco. She reports that she does not currently use alcohol. She reports that she does not use drugs.           Family History:   History reviewed. No pertinent family history.           Allergies:     Allergies   Allergen Reactions     Nuts               Medications:     Prior to Admission medications    Medication Sig Start Date End Date Taking? Authorizing Provider   acetaminophen (TYLENOL) 325 MG tablet Take 2 tablets (650 mg) by mouth every 6 hours as needed for mild pain or fever 3/12/23  Yes Yris Krause MD   cephALEXin (KEFLEX) 500 MG capsule Take 500 mg by mouth 4 times daily For 10 days starting 4/1/23.   Yes Unknown, Entered By History   ferrous sulfate (FEROSUL) 325 (65 Fe) MG tablet Take 325 mg by mouth daily (with breakfast)   Yes Reported, Patient   ibuprofen (ADVIL/MOTRIN) 800 MG tablet Take 1 tablet (800 mg) by mouth every 6 hours as needed for other (cramping) 3/12/23  Yes Yris Krause MD   venlafaxine (EFFEXOR XR) 37.5 MG 24 hr capsule Take 37.5 mg by mouth daily   Yes Unknown, Entered By History              Review of Systems:   The Review of Systems is negative other than noted in the HPI            Physical Exam:     Patient Vitals for the past 24 hrs:   BP Temp Temp src Pulse Resp SpO2 Height Weight   04/03/23 0929 127/86 97.5  F (36.4  C) Oral 75 16 97 % -- --   04/03/23 0500 (!) 135/90 98  F (36.7  C) Oral 87 18 98 % -- --   04/03/23 0323 (!) 134/97 -- -- 84 18 99 % -- --   04/03/23 0200 (!) 134/90 -- -- 75 -- 99 % -- --   04/03/23 0145 132/89 -- -- 75 -- 99 % -- --   04/03/23 0130 132/87 -- -- 88 -- 99 % -- --   04/03/23 0101 (!) 144/80 98.2  F (36.8  C) Oral -- 16 98 % 1.778 m (5' 10\") 75.8 kg (167 lb)          Intake/Output Summary (Last 24 hours) at 4/3/2023 0938  Last " data filed at 4/3/2023 0409  Gross per 24 hour   Intake 100 ml   Output --   Net 100 ml      Constitutional:   awake, alert, cooperative, no apparent distress, and appears stated age       Eyes:   PERRL, conjunctiva/corneas clear, EOM's intact; no scleral edema or icterus noted        ENT:   Normocephalic, without obvious abnormality, atraumatic, Lips, mucosa, and tongue normal        Hematologic / Lymphatic:   No lymphadenopathy       Lungs:   Normal respiratory effort, no accessory muscle use       Cardiovascular:   Regular rate and rhythm       Abdomen:   Soft, nondistended, nontender       Musculoskeletal:   No obvious swelling, bruising or deformity       Skin:   Right breast - mild erythema, there is roughly a 3x5cm area of induration at the 10 o'clock position just superior to her nipples, moderate ttp, slightly fluctuance, but still mostly indurated, no overly skin changes or drainage             Data:        Admission on 04/03/2023   Component Date Value     Sodium 04/03/2023 142      Potassium 04/03/2023 2.4 (LL)      Chloride 04/03/2023 106      Carbon Dioxide (CO2) 04/03/2023 28      Anion Gap 04/03/2023 8      Urea Nitrogen 04/03/2023 8      Creatinine 04/03/2023 0.60      Calcium 04/03/2023 9.0      Glucose 04/03/2023 112      GFR Estimate 04/03/2023 >90      CRP 04/03/2023 4.7 (H)      WBC Count 04/03/2023 8.3      RBC Count 04/03/2023 3.37 (L)      Hemoglobin 04/03/2023 9.2 (L)      Hematocrit 04/03/2023 29.5 (L)      MCV 04/03/2023 88      MCH 04/03/2023 27.3      MCHC 04/03/2023 31.2 (L)      RDW 04/03/2023 17.5 (H)      Platelet Count 04/03/2023 295      % Neutrophils 04/03/2023 61      % Lymphocytes 04/03/2023 25      % Monocytes 04/03/2023 10      % Eosinophils 04/03/2023 3      % Basophils 04/03/2023 0      % Immature Granulocytes 04/03/2023 1      NRBCs per 100 WBC 04/03/2023 0      Absolute Neutrophils 04/03/2023 5.1      Absolute Lymphocytes 04/03/2023 2.1      Absolute Monocytes 04/03/2023  0.9      Absolute Eosinophils 04/03/2023 0.2      Absolute Basophils 04/03/2023 0.0      Absolute Immature Granul* 04/03/2023 0.0      Absolute NRBCs 04/03/2023 0.0      Magnesium 04/03/2023 1.7 (L)      Potassium 04/03/2023 3.0 (L)          All imaging studies reviewed by me.

## 2023-04-03 NOTE — PLAN OF CARE
Problem: Plan of Care - These are the overarching goals to be used throughout the patient stay.    Goal: Absence of Hospital-Acquired Illness or Injury  Intervention: Prevent Skin Injury  Recent Flowsheet Documentation  Taken 4/3/2023 1540 by Nayeli Elizabeth RN  Body Position: position changed independently  Taken 4/3/2023 0821 by Nayeil Elizabeth RN  Body Position: position changed independently     Problem: Plan of Care - These are the overarching goals to be used throughout the patient stay.    Goal: Plan of Care Review  Description: The Plan of Care Review/Shift note should be completed every shift.  The Outcome Evaluation is a brief statement about your assessment that the patient is improving, declining, or no change.  This information will be displayed automatically on your shift note.  Outcome: Progressing  Flowsheets (Taken 4/3/2023 1732)  Plan of Care Reviewed With: patient   Goal Outcome Evaluation:      Plan of Care Reviewed With: patient    Pt is pumping Q3 hrs, R breast  to touch, NPO after MN for possible I & D for tomorrow, on IV AB.

## 2023-04-03 NOTE — PHARMACY-ADMISSION MEDICATION HISTORY
Pharmacy Note - Admission Medication History    Pertinent Provider Information:      ______________________________________________________________________    Prior To Admission (PTA) med list completed and updated in EMR.       PTA Med List   Medication Sig Note Last Dose     acetaminophen (TYLENOL) 325 MG tablet Take 2 tablets (650 mg) by mouth every 6 hours as needed for mild pain or fever  4/2/2023 at prn     cephALEXin (KEFLEX) 500 MG capsule Take 500 mg by mouth 4 times daily For 10 days starting 4/1/23. 4/3/2023: Start date: 4/1/23  Last home dose: 4/2/23 PM  4/2/2023     ferrous sulfate (FEROSUL) 325 (65 Fe) MG tablet Take 325 mg by mouth daily (with breakfast)  4/2/2023 at am     ibuprofen (ADVIL/MOTRIN) 800 MG tablet Take 1 tablet (800 mg) by mouth every 6 hours as needed for other (cramping)  4/2/2023 at prn     venlafaxine (EFFEXOR XR) 37.5 MG 24 hr capsule Take 37.5 mg by mouth daily  4/2/2023 at am       Information source(s): Patient  Method of interview communication: in-person      Patient was asked about OTC/herbal products specifically.  PTA med list reflects this.    In the past week, patient estimated taking medication this percent of the time:  greater than 90%.    Medication Affordability:       Allergies were reviewed, assessed, and updated with the patient.      Patient did not bring any medications to the hospital and can't retrieve from home. No multi-dose medications are available for use during hospital stay.     The information provided in this note is only as accurate as the sources available at the time of the update(s).    Thank you for the opportunity to participate in the care of this patient.    [place]  4/3/2023 9:35 AM

## 2023-04-03 NOTE — ED PROVIDER NOTES
EMERGENCY DEPARTMENT ENCOUNTER      NAME: Stefany Slater  AGE: 27 year old female  YOB: 1995  MRN: [id number]  EVALUATION DATE & TIME: 4/3/2023  1:01 AM    PCP: Rita Hines    ED PROVIDER: Mark Farrell M.D.      Chief Complaint   Patient presents with     Breast Pain         FINAL IMPRESSION:  1. Mastitis          ED COURSE & MEDICAL DECISION MAKIN year old female presents to the Emergency Department for evaluation of breast pain.  She is 3 weeks postpartum and has been on antibiotics for the last 2 days for right breast swelling and discomfort.  Despite the oral Keflex symptoms seem to be worsening.  She has some tenderness and fluctuance to the right upper outer quadrant of her breast.  She underwent some lab testing and ultrasound imaging here.  Ultrasound was concerning for a small breast abscess which might explain why she is refractory to oral antibiotics.  Discussed case with OB/GYN who will admit admit the patient primarily to their service and plan for general surgery consultation for possible I&D today.  Also notable in the patient's lab was low potassium, started oral replacement here.  She was started on vancomycin and ceftriaxone for soft tissue infection.  Patient was admitted in stable condition.    1:06 AM I met with the patient, obtained history, performed an initial exam, and discussed options and plan for diagnostics and treatment here in the ED.  3:15 AM Consulted with Sanaz DELATORRE, Dr. Hines, who is in agreement with admission.     At the conclusion of the encounter I discussed the results of all of the tests and the disposition. The questions were answered. The patient or family acknowledged understanding and was agreeable with the care plan.       Medical Decision Making    History:    Supplemental history from: Documented in chart, if applicable    External Record(s) reviewed: Documented in chart, if applicable.    Work Up:    Chart documentation includes  differential considered and any EKGs or imaging independently interpreted by provider, where specified.    In additional to work up documented, I considered the following work up: Documented in chart, if applicable.    External consultation:    Discussion of management with another provider: OB-Gyn    Complicating factors:    Care impacted by chronic illness: N/A    Care affected by social determinants of health: N/A    Disposition considerations: Admit.            MEDICATIONS GIVEN IN THE EMERGENCY:  Medications   melatonin tablet 1 mg (has no administration in time range)   ondansetron (ZOFRAN ODT) ODT tab 4 mg (has no administration in time range)     Or   ondansetron (ZOFRAN) injection 4 mg (has no administration in time range)   acetaminophen (TYLENOL) tablet 975 mg (975 mg Oral $Given 4/3/23 0541)   ibuprofen (ADVIL/MOTRIN) tablet 600 mg (600 mg Oral $Given 4/3/23 0541)   senna-docusate (SENOKOT-S/PERICOLACE) 8.6-50 MG per tablet 1 tablet (has no administration in time range)     Or   senna-docusate (SENOKOT-S/PERICOLACE) 8.6-50 MG per tablet 2 tablet (has no administration in time range)   vancomycin (VANCOCIN) 1,250 mg in 0.9% NaCl 250 mL intermittent infusion (has no administration in time range)   potassium chloride ER (KLOR-CON M) CR tablet 40 mEq (40 mEq Oral $Given 4/3/23 0248)   cefTRIAXone (ROCEPHIN) 1 g vial to attach to  mL bag for ADULTS or NS 50 mL bag for PEDS (0 g Intravenous Stopped 4/3/23 0409)   vancomycin (VANCOCIN) 1,500 mg in 0.9% NaCl 250 mL intermittent infusion (1,500 mg Intravenous $New Bag 4/3/23 0409)       NEW PRESCRIPTIONS STARTED AT TODAY'S ER VISIT  Current Discharge Medication List             =================================================================    HPI    Patient information was obtained from: Patient     Use of : N/A- Language: English         Stefany Slater is a 27 year old female with a pertinent history of anxiety who presents to this ED by  walking for evaluation of breast pain.     Patient was seen in [facility] two days ago and diagnosed with mastitis. At present, patient states symptoms have not helped despite the antibiotics (keflex). Patient currently endorses right breast pain, right nipple discoloration, and a hard bump on the top of her right breast. States she had a fever of 101.9 F yesterday but no fever today. Patient is still pumping but states that there is now pus like discharge coming out. Patient called her OBGYN and they recommended patient present to the ED. Otherwise patient denies any other complaints at this time.    Per chart review: Patient was seen at [facility] on 04/01/2023 for breast pain. Patient is 3 weeks postpartum and is breast-feeding/pumping. Exam was consistent with mastitis. Patient was started on Keflex for treatment and instructed to have close follow up with her OB to ensure resolution.         REVIEW OF SYSTEMS   All systems reviewed and negative except as noted in HPI.    PAST MEDICAL HISTORY:  Past Medical History:   Diagnosis Date     Anxiety        PAST SURGICAL HISTORY:  Past Surgical History:   Procedure Laterality Date     TONSILLECTOMY Bilateral 2013           CURRENT MEDICATIONS:    Current Facility-Administered Medications   Medication     acetaminophen (TYLENOL) tablet 975 mg     ibuprofen (ADVIL/MOTRIN) tablet 600 mg     melatonin tablet 1 mg     ondansetron (ZOFRAN ODT) ODT tab 4 mg    Or     ondansetron (ZOFRAN) injection 4 mg     senna-docusate (SENOKOT-S/PERICOLACE) 8.6-50 MG per tablet 1 tablet    Or     senna-docusate (SENOKOT-S/PERICOLACE) 8.6-50 MG per tablet 2 tablet     vancomycin (VANCOCIN) 1,250 mg in 0.9% NaCl 250 mL intermittent infusion         ALLERGIES:  Allergies   Allergen Reactions     Nuts        FAMILY HISTORY:  History reviewed. No pertinent family history.    SOCIAL HISTORY:   Social History     Socioeconomic History     Marital status:    Tobacco Use     Smoking  "status: Never     Passive exposure: Never     Smokeless tobacco: Never   Substance and Sexual Activity     Alcohol use: Not Currently     Drug use: Never     Sexual activity: Not Currently       VITALS:  BP (!) 135/90 (BP Location: Left arm, Patient Position: Semi-Turk's, Cuff Size: Adult Regular)   Pulse 87   Temp 98  F (36.7  C) (Oral)   Resp 18   Ht 1.778 m (5' 10\")   Wt 75.8 kg (167 lb)   SpO2 98%   BMI 23.96 kg/m      PHYSICAL EXAM    Constitutional: Well developed, Well nourished, NAD.  HENT: Normocephalic, Atraumatic. Neck Supple.  Eyes: EOMI, Conjunctiva normal.  Respiratory: Breathing comfortably on room air. Speaks full sentences easily. Lungs clear to ascultation.  Cardiovascular: Normal heart rate, Regular rhythm. No peripheral edema.  Abdomen: Soft  Musculoskeletal: There is tenderness swelling and some fluctuance to the right upper outer breast quadrant.  There is mild overlying erythema and asymmetric warmth.  Integument: Warm, Dry.  Neurologic: Alert & awake, Normal motor function, Normal sensory function, No focal deficits noted.   Psychiatric: Cooperative. Affect appropriate.     LAB:  All pertinent labs reviewed and interpreted.  Labs Ordered and Resulted from Time of ED Arrival to Time of ED Departure   BASIC METABOLIC PANEL - Abnormal       Result Value    Sodium 142      Potassium 2.4 (*)     Chloride 106      Carbon Dioxide (CO2) 28      Anion Gap 8      Urea Nitrogen 8      Creatinine 0.60      Calcium 9.0      Glucose 112      GFR Estimate >90     CRP INFLAMMATION - Abnormal    CRP 4.7 (*)    CBC WITH PLATELETS AND DIFFERENTIAL - Abnormal    WBC Count 8.3      RBC Count 3.37 (*)     Hemoglobin 9.2 (*)     Hematocrit 29.5 (*)     MCV 88      MCH 27.3      MCHC 31.2 (*)     RDW 17.5 (*)     Platelet Count 295      % Neutrophils 61      % Lymphocytes 25      % Monocytes 10      % Eosinophils 3      % Basophils 0      % Immature Granulocytes 1      NRBCs per 100 WBC 0      Absolute " Neutrophils 5.1      Absolute Lymphocytes 2.1      Absolute Monocytes 0.9      Absolute Eosinophils 0.2      Absolute Basophils 0.0      Absolute Immature Granulocytes 0.0      Absolute NRBCs 0.0     MAGNESIUM - Abnormal    Magnesium 1.7 (*)        RADIOLOGY:  Reviewed all pertinent imaging. Please see official radiology report.  US Breast Right Limited 1-3 Quadrants   Preliminary Result   IMPRESSION:    3.2 cm hypoechoic focus in the right breast at the 10:00 position, approximately 6 cm from the nipple, which could reflect a small fluid collection/abscess in the appropriate clinical setting. Recommend dedicated follow-up in [facility].      ACR BI-RADS Category 0: Incomplete: Need Additional Imaging Evaluation and/or Prior Mammograms for Comparison.      Results given to the patient.      Preliminary Interpretation Dictated By: Luther Jansen MD   Date: 4/3/2023             I, Ashleigh Navarro, am serving as a scribe to document services personally performed by Dr. Mark Farrell, based on my observation and the provider's statements to me. IMark MD attest that Ashleigh Navarro is acting in a scribe capacity, has observed my performance of the services and has documented them in accordance with my direction.    Mark Farrell M.D.  Emergency Medicine  [facility] EMERGENCY ROOM  [address] [id number] [phone]  Dept: [phone]     Mark Farrell MD  04/03/23 0540

## 2023-04-03 NOTE — PROGRESS NOTES
Care Management Follow Up    Length of Stay (days): 0    Expected Discharge Date: 04/05/2023     Concerns to be Addressed: no discharge needs identified     Patient plan of care discussed at interdisciplinary rounds: Yes    Anticipated Discharge Disposition: Home     Anticipated Discharge Services: None  Anticipated Discharge DME: None    Patient/family educated on Medicare website which has current facility and service quality ratings: no  Education Provided on the Discharge Plan: No  Patient/Family in Agreement with the Plan: yes    Referrals Placed by CM/SW:    Private pay costs discussed: Not applicable    Additional Information:  Chart Reviewed: No CM serviced identified. Anticipate Pt to discharge home. Family to transport.       Stacy Diaz

## 2023-04-03 NOTE — TELEPHONE ENCOUNTER
"Triage Call    Pt calling in tears. Was started on antibiotics for Mastitis   2 days ago.  Has taken 8 doses of the medication and has gotten no relief.    Says her breast is \"hot to touch\", \"hard as a rock\", and severely painful.  Says she is unable to pump or manually express the breast, and baby is unable to latch or feed from that side. Feels like the Mastitis is getting worse.    Says her fever has gone away since starting the treatment, but the pain is intolerable.  Infant is 3.5 weeks old.    Pt delivered at [facility], but has a non  OB Provider.  Advised pt to contact the On-Call OB provider through the Answering Service for her Clinic, to discuss this issue.      Candace Serrano RN      Reason for Disposition    [1] Taking antibiotics > 24 hours AND [2] symptoms WORSE    Additional Information    Negative: SEVERE difficulty breathing (e.g., struggling for each breath, speaks in single words)    Negative: Sounds like a life-threatening emergency to the triager    Negative: [1] Recent hospitalization for pneumonia AND [2] taking an antibiotic    Negative: [1] Animal bite infection AND [2] taking an antibiotic    Negative: [1] Cellulitis AND [2] taking an antibiotic    Negative: [1] Ear  infection (Otitis Media) AND [2] taking an antibiotic    Negative: [1] Ear  infection (Swimmer's Ear) AND [2] taking an antibiotic    Negative: [1] Sinus infection AND [2] taking an antibiotic    Negative: [1] Strep throat AND [2] taking an antibiotic    Negative: [1] Urinary tract  infection (e.g., cystitis, pyelonephritis, urethritis, epididymitis) AND [2] male AND [3] taking an antibiotic    Negative: [1] Urinary tract  infection (e.g., cystitis, pyelonephritis, urethritis) AND [2] female AND [3] taking an antibiotic    Negative: [1] Wound infection AND [2] taking an antibiotic    Negative: MODERATE difficulty breathing (e.g., speaks in phrases, SOB even at rest, pulse 100-120)    Negative: Fever > 103 F (39.4 " C)    Negative: Patient sounds very sick or weak to the triager    Protocols used: INFECTION ON ANTIBIOTIC FOLLOW-UP CALL-A-AH

## 2023-04-04 ENCOUNTER — ANESTHESIA EVENT (OUTPATIENT)
Dept: SURGERY | Facility: CLINIC | Age: 28
DRG: 769 | End: 2023-04-04
Payer: OTHER GOVERNMENT

## 2023-04-04 ENCOUNTER — ANESTHESIA (OUTPATIENT)
Dept: SURGERY | Facility: CLINIC | Age: 28
DRG: 769 | End: 2023-04-04
Payer: OTHER GOVERNMENT

## 2023-04-04 LAB
GRAM STAIN RESULT: ABNORMAL
HOLD SPECIMEN: NORMAL
POTASSIUM BLD-SCNC: 3.2 MMOL/L (ref 3.5–5)
POTASSIUM BLD-SCNC: 3.5 MMOL/L (ref 3.5–5)
VANCOMYCIN SERPL-MCNC: 7.1 MG/L

## 2023-04-04 PROCEDURE — 250N000011 HC RX IP 250 OP 636: Performed by: OBSTETRICS & GYNECOLOGY

## 2023-04-04 PROCEDURE — 19020 MASTOTOMY EXPL DRG ABSC DP: CPT | Performed by: SURGERY

## 2023-04-04 PROCEDURE — 250N000009 HC RX 250: Performed by: ANESTHESIOLOGY

## 2023-04-04 PROCEDURE — 84132 ASSAY OF SERUM POTASSIUM: CPT | Performed by: OBSTETRICS & GYNECOLOGY

## 2023-04-04 PROCEDURE — 258N000003 HC RX IP 258 OP 636: Performed by: OBSTETRICS & GYNECOLOGY

## 2023-04-04 PROCEDURE — 250N000013 HC RX MED GY IP 250 OP 250 PS 637: Performed by: SURGERY

## 2023-04-04 PROCEDURE — 80202 ASSAY OF VANCOMYCIN: CPT | Performed by: OBSTETRICS & GYNECOLOGY

## 2023-04-04 PROCEDURE — 370N000017 HC ANESTHESIA TECHNICAL FEE, PER MIN: Performed by: SURGERY

## 2023-04-04 PROCEDURE — 360N000075 HC SURGERY LEVEL 2, PER MIN: Performed by: SURGERY

## 2023-04-04 PROCEDURE — 250N000013 HC RX MED GY IP 250 OP 250 PS 637: Performed by: OBSTETRICS & GYNECOLOGY

## 2023-04-04 PROCEDURE — 258N000003 HC RX IP 258 OP 636: Performed by: ANESTHESIOLOGY

## 2023-04-04 PROCEDURE — 250N000011 HC RX IP 250 OP 636: Performed by: NURSE ANESTHETIST, CERTIFIED REGISTERED

## 2023-04-04 PROCEDURE — 36415 COLL VENOUS BLD VENIPUNCTURE: CPT | Performed by: OBSTETRICS & GYNECOLOGY

## 2023-04-04 PROCEDURE — 87070 CULTURE OTHR SPECIMN AEROBIC: CPT | Performed by: SURGERY

## 2023-04-04 PROCEDURE — 250N000009 HC RX 250: Performed by: NURSE ANESTHETIST, CERTIFIED REGISTERED

## 2023-04-04 PROCEDURE — 999N000141 HC STATISTIC PRE-PROCEDURE NURSING ASSESSMENT: Performed by: SURGERY

## 2023-04-04 PROCEDURE — 120N000001 HC R&B MED SURG/OB

## 2023-04-04 PROCEDURE — 250N000009 HC RX 250: Performed by: SURGERY

## 2023-04-04 PROCEDURE — 272N000001 HC OR GENERAL SUPPLY STERILE: Performed by: SURGERY

## 2023-04-04 PROCEDURE — 0H9T0ZZ DRAINAGE OF RIGHT BREAST, OPEN APPROACH: ICD-10-PCS | Performed by: SURGERY

## 2023-04-04 PROCEDURE — 87205 SMEAR GRAM STAIN: CPT | Performed by: SURGERY

## 2023-04-04 PROCEDURE — 87075 CULTR BACTERIA EXCEPT BLOOD: CPT | Performed by: SURGERY

## 2023-04-04 RX ORDER — POTASSIUM CHLORIDE 1500 MG/1
40 TABLET, EXTENDED RELEASE ORAL ONCE
Status: COMPLETED | OUTPATIENT
Start: 2023-04-04 | End: 2023-04-04

## 2023-04-04 RX ORDER — ONDANSETRON 2 MG/ML
4 INJECTION INTRAMUSCULAR; INTRAVENOUS EVERY 6 HOURS PRN
Status: DISCONTINUED | OUTPATIENT
Start: 2023-04-04 | End: 2023-04-05 | Stop reason: HOSPADM

## 2023-04-04 RX ORDER — NALOXONE HYDROCHLORIDE 0.4 MG/ML
0.2 INJECTION, SOLUTION INTRAMUSCULAR; INTRAVENOUS; SUBCUTANEOUS
Status: DISCONTINUED | OUTPATIENT
Start: 2023-04-04 | End: 2023-04-05 | Stop reason: HOSPADM

## 2023-04-04 RX ORDER — NALOXONE HYDROCHLORIDE 0.4 MG/ML
0.4 INJECTION, SOLUTION INTRAMUSCULAR; INTRAVENOUS; SUBCUTANEOUS
Status: DISCONTINUED | OUTPATIENT
Start: 2023-04-04 | End: 2023-04-05 | Stop reason: HOSPADM

## 2023-04-04 RX ORDER — LIDOCAINE 40 MG/G
CREAM TOPICAL
Status: DISCONTINUED | OUTPATIENT
Start: 2023-04-04 | End: 2023-04-05 | Stop reason: HOSPADM

## 2023-04-04 RX ORDER — HYDROMORPHONE HCL IN WATER/PF 6 MG/30 ML
0.4 PATIENT CONTROLLED ANALGESIA SYRINGE INTRAVENOUS
Status: DISCONTINUED | OUTPATIENT
Start: 2023-04-04 | End: 2023-04-05 | Stop reason: HOSPADM

## 2023-04-04 RX ORDER — PROCHLORPERAZINE MALEATE 10 MG
10 TABLET ORAL EVERY 6 HOURS PRN
Status: DISCONTINUED | OUTPATIENT
Start: 2023-04-04 | End: 2023-04-05 | Stop reason: HOSPADM

## 2023-04-04 RX ORDER — ACETAMINOPHEN 325 MG/1
650 TABLET ORAL EVERY 4 HOURS PRN
Status: DISCONTINUED | OUTPATIENT
Start: 2023-04-07 | End: 2023-04-05 | Stop reason: HOSPADM

## 2023-04-04 RX ORDER — SCOLOPAMINE TRANSDERMAL SYSTEM 1 MG/1
1 PATCH, EXTENDED RELEASE TRANSDERMAL
Status: DISCONTINUED | OUTPATIENT
Start: 2023-04-04 | End: 2023-04-05 | Stop reason: HOSPADM

## 2023-04-04 RX ORDER — LIDOCAINE HYDROCHLORIDE 10 MG/ML
INJECTION, SOLUTION INFILTRATION; PERINEURAL PRN
Status: DISCONTINUED | OUTPATIENT
Start: 2023-04-04 | End: 2023-04-04

## 2023-04-04 RX ORDER — SODIUM CHLORIDE, SODIUM LACTATE, POTASSIUM CHLORIDE, CALCIUM CHLORIDE 600; 310; 30; 20 MG/100ML; MG/100ML; MG/100ML; MG/100ML
INJECTION, SOLUTION INTRAVENOUS CONTINUOUS
Status: DISCONTINUED | OUTPATIENT
Start: 2023-04-04 | End: 2023-04-05 | Stop reason: HOSPADM

## 2023-04-04 RX ORDER — OXYCODONE HYDROCHLORIDE 5 MG/1
5 TABLET ORAL EVERY 4 HOURS PRN
Status: DISCONTINUED | OUTPATIENT
Start: 2023-04-04 | End: 2023-04-05 | Stop reason: HOSPADM

## 2023-04-04 RX ORDER — AMOXICILLIN 250 MG
1 CAPSULE ORAL 2 TIMES DAILY
Status: DISCONTINUED | OUTPATIENT
Start: 2023-04-04 | End: 2023-04-04

## 2023-04-04 RX ORDER — ONDANSETRON 4 MG/1
4 TABLET, ORALLY DISINTEGRATING ORAL EVERY 6 HOURS PRN
Status: DISCONTINUED | OUTPATIENT
Start: 2023-04-04 | End: 2023-04-05 | Stop reason: HOSPADM

## 2023-04-04 RX ORDER — HYDROMORPHONE HCL IN WATER/PF 6 MG/30 ML
0.2 PATIENT CONTROLLED ANALGESIA SYRINGE INTRAVENOUS
Status: DISCONTINUED | OUTPATIENT
Start: 2023-04-04 | End: 2023-04-05 | Stop reason: HOSPADM

## 2023-04-04 RX ORDER — OXYCODONE HYDROCHLORIDE 5 MG/1
10 TABLET ORAL
Status: CANCELLED | OUTPATIENT
Start: 2023-04-04

## 2023-04-04 RX ORDER — ONDANSETRON 4 MG/1
4 TABLET, ORALLY DISINTEGRATING ORAL EVERY 30 MIN PRN
Status: CANCELLED | OUTPATIENT
Start: 2023-04-04

## 2023-04-04 RX ORDER — HYDROMORPHONE HCL IN WATER/PF 6 MG/30 ML
0.4 PATIENT CONTROLLED ANALGESIA SYRINGE INTRAVENOUS EVERY 5 MIN PRN
Status: CANCELLED | OUTPATIENT
Start: 2023-04-04

## 2023-04-04 RX ORDER — LIDOCAINE HYDROCHLORIDE AND EPINEPHRINE 10; 10 MG/ML; UG/ML
INJECTION, SOLUTION INFILTRATION; PERINEURAL PRN
Status: DISCONTINUED | OUTPATIENT
Start: 2023-04-04 | End: 2023-04-04 | Stop reason: HOSPADM

## 2023-04-04 RX ORDER — SODIUM CHLORIDE, SODIUM LACTATE, POTASSIUM CHLORIDE, CALCIUM CHLORIDE 600; 310; 30; 20 MG/100ML; MG/100ML; MG/100ML; MG/100ML
INJECTION, SOLUTION INTRAVENOUS CONTINUOUS
Status: CANCELLED | OUTPATIENT
Start: 2023-04-04

## 2023-04-04 RX ORDER — FENTANYL CITRATE 50 UG/ML
50 INJECTION, SOLUTION INTRAMUSCULAR; INTRAVENOUS EVERY 5 MIN PRN
Status: CANCELLED | OUTPATIENT
Start: 2023-04-04

## 2023-04-04 RX ORDER — FENTANYL CITRATE 50 UG/ML
INJECTION, SOLUTION INTRAMUSCULAR; INTRAVENOUS PRN
Status: DISCONTINUED | OUTPATIENT
Start: 2023-04-04 | End: 2023-04-04

## 2023-04-04 RX ORDER — ONDANSETRON 2 MG/ML
INJECTION INTRAMUSCULAR; INTRAVENOUS PRN
Status: DISCONTINUED | OUTPATIENT
Start: 2023-04-04 | End: 2023-04-04

## 2023-04-04 RX ORDER — FENTANYL CITRATE 50 UG/ML
25 INJECTION, SOLUTION INTRAMUSCULAR; INTRAVENOUS EVERY 5 MIN PRN
Status: CANCELLED | OUTPATIENT
Start: 2023-04-04

## 2023-04-04 RX ORDER — ACETAMINOPHEN 325 MG/1
975 TABLET ORAL EVERY 8 HOURS
Status: DISCONTINUED | OUTPATIENT
Start: 2023-04-04 | End: 2023-04-05 | Stop reason: HOSPADM

## 2023-04-04 RX ORDER — OXYCODONE HYDROCHLORIDE 5 MG/1
10 TABLET ORAL EVERY 4 HOURS PRN
Status: DISCONTINUED | OUTPATIENT
Start: 2023-04-04 | End: 2023-04-05 | Stop reason: HOSPADM

## 2023-04-04 RX ORDER — PROPOFOL 10 MG/ML
INJECTION, EMULSION INTRAVENOUS CONTINUOUS PRN
Status: DISCONTINUED | OUTPATIENT
Start: 2023-04-04 | End: 2023-04-04

## 2023-04-04 RX ORDER — POLYETHYLENE GLYCOL 3350 17 G/17G
17 POWDER, FOR SOLUTION ORAL DAILY
Status: DISCONTINUED | OUTPATIENT
Start: 2023-04-05 | End: 2023-04-05 | Stop reason: HOSPADM

## 2023-04-04 RX ORDER — HYDROMORPHONE HCL IN WATER/PF 6 MG/30 ML
0.2 PATIENT CONTROLLED ANALGESIA SYRINGE INTRAVENOUS EVERY 5 MIN PRN
Status: CANCELLED | OUTPATIENT
Start: 2023-04-04

## 2023-04-04 RX ORDER — ACETAMINOPHEN 325 MG/1
975 TABLET ORAL EVERY 8 HOURS
Status: DISCONTINUED | OUTPATIENT
Start: 2023-04-04 | End: 2023-04-04

## 2023-04-04 RX ORDER — OXYCODONE HYDROCHLORIDE 5 MG/1
5 TABLET ORAL
Status: CANCELLED | OUTPATIENT
Start: 2023-04-04

## 2023-04-04 RX ORDER — BISACODYL 10 MG
10 SUPPOSITORY, RECTAL RECTAL DAILY PRN
Status: DISCONTINUED | OUTPATIENT
Start: 2023-04-04 | End: 2023-04-05 | Stop reason: HOSPADM

## 2023-04-04 RX ORDER — PROPOFOL 10 MG/ML
INJECTION, EMULSION INTRAVENOUS PRN
Status: DISCONTINUED | OUTPATIENT
Start: 2023-04-04 | End: 2023-04-04

## 2023-04-04 RX ORDER — ACETAMINOPHEN 325 MG/1
975 TABLET ORAL ONCE
Status: COMPLETED | OUTPATIENT
Start: 2023-04-04 | End: 2023-04-04

## 2023-04-04 RX ORDER — ONDANSETRON 2 MG/ML
4 INJECTION INTRAMUSCULAR; INTRAVENOUS EVERY 30 MIN PRN
Status: CANCELLED | OUTPATIENT
Start: 2023-04-04

## 2023-04-04 RX ADMIN — VANCOMYCIN HYDROCHLORIDE 1250 MG: 5 INJECTION, POWDER, LYOPHILIZED, FOR SOLUTION INTRAVENOUS at 22:49

## 2023-04-04 RX ADMIN — MIDAZOLAM 2 MG: 1 INJECTION INTRAMUSCULAR; INTRAVENOUS at 08:49

## 2023-04-04 RX ADMIN — PROPOFOL 60 MG: 10 INJECTION, EMULSION INTRAVENOUS at 08:56

## 2023-04-04 RX ADMIN — ONDANSETRON 4 MG: 2 INJECTION INTRAMUSCULAR; INTRAVENOUS at 09:10

## 2023-04-04 RX ADMIN — SCOPALAMINE 1 PATCH: 1 PATCH, EXTENDED RELEASE TRANSDERMAL at 08:44

## 2023-04-04 RX ADMIN — OXYCODONE HYDROCHLORIDE 10 MG: 5 TABLET ORAL at 22:47

## 2023-04-04 RX ADMIN — IBUPROFEN 600 MG: 600 TABLET ORAL at 11:26

## 2023-04-04 RX ADMIN — PROPOFOL 150 MCG/KG/MIN: 10 INJECTION, EMULSION INTRAVENOUS at 08:56

## 2023-04-04 RX ADMIN — ACETAMINOPHEN 975 MG: 325 TABLET ORAL at 05:58

## 2023-04-04 RX ADMIN — FENTANYL CITRATE 50 MCG: 50 INJECTION, SOLUTION INTRAMUSCULAR; INTRAVENOUS at 09:13

## 2023-04-04 RX ADMIN — ACETAMINOPHEN 975 MG: 325 TABLET ORAL at 13:37

## 2023-04-04 RX ADMIN — VANCOMYCIN HYDROCHLORIDE 1250 MG: 5 INJECTION, POWDER, LYOPHILIZED, FOR SOLUTION INTRAVENOUS at 04:14

## 2023-04-04 RX ADMIN — POTASSIUM CHLORIDE 40 MEQ: 1500 TABLET, EXTENDED RELEASE ORAL at 11:26

## 2023-04-04 RX ADMIN — SENNOSIDES AND DOCUSATE SODIUM 1 TABLET: 50; 8.6 TABLET ORAL at 21:53

## 2023-04-04 RX ADMIN — VENLAFAXINE HYDROCHLORIDE 37.5 MG: 37.5 CAPSULE, EXTENDED RELEASE ORAL at 07:53

## 2023-04-04 RX ADMIN — SENNOSIDES AND DOCUSATE SODIUM 2 TABLET: 50; 8.6 TABLET ORAL at 11:25

## 2023-04-04 RX ADMIN — MAGNESIUM HYDROXIDE 30 ML: 400 SUSPENSION ORAL at 17:12

## 2023-04-04 RX ADMIN — LIDOCAINE HYDROCHLORIDE 20 MG: 10 INJECTION, SOLUTION INFILTRATION; PERINEURAL at 08:56

## 2023-04-04 RX ADMIN — FENTANYL CITRATE 50 MCG: 50 INJECTION, SOLUTION INTRAMUSCULAR; INTRAVENOUS at 08:56

## 2023-04-04 RX ADMIN — ACETAMINOPHEN 975 MG: 325 TABLET ORAL at 21:52

## 2023-04-04 RX ADMIN — IBUPROFEN 600 MG: 600 TABLET ORAL at 18:46

## 2023-04-04 RX ADMIN — VANCOMYCIN HYDROCHLORIDE 1250 MG: 5 INJECTION, POWDER, LYOPHILIZED, FOR SOLUTION INTRAVENOUS at 15:22

## 2023-04-04 RX ADMIN — SODIUM CHLORIDE, POTASSIUM CHLORIDE, SODIUM LACTATE AND CALCIUM CHLORIDE: 600; 310; 30; 20 INJECTION, SOLUTION INTRAVENOUS at 08:44

## 2023-04-04 ASSESSMENT — ACTIVITIES OF DAILY LIVING (ADL)
ADLS_ACUITY_SCORE: 18

## 2023-04-04 NOTE — ANESTHESIA PREPROCEDURE EVALUATION
Anesthesia Pre-Procedure Evaluation    Patient: Stefany Slater   MRN: [id number] : 1995        Procedure : Procedure(s):  INCISION AND DRAINAGE, RIGHT BREAST ABSCESS          Past Medical History:   Diagnosis Date     Anxiety      History of blood transfusion      Hypertension      PONV (postoperative nausea and vomiting)       Past Surgical History:   Procedure Laterality Date     TONSILLECTOMY Bilateral 2013      Allergies   Allergen Reactions     Nuts       Social History     Tobacco Use     Smoking status: Never     Passive exposure: Never     Smokeless tobacco: Never   Vaping Use     Vaping status: Not on file   Substance Use Topics     Alcohol use: Not Currently      Wt Readings from Last 1 Encounters:   23 75.8 kg (167 lb)        Anesthesia Evaluation   Pt has had prior anesthetic.     History of anesthetic complications  - PONV.      ROS/MED HX  ENT/Pulmonary:  - neg pulmonary ROS     Neurologic:  - neg neurologic ROS     Cardiovascular:     (+) hypertension----- (-) murmur and wheezes   METS/Exercise Tolerance: >4 METS    Hematologic:  - neg hematologic  ROS     Musculoskeletal:  - neg musculoskeletal ROS     GI/Hepatic:  - neg GI/hepatic ROS     Renal/Genitourinary:  - neg Renal ROS     Endo:  - neg endo ROS     Psychiatric/Substance Use:  - neg psychiatric ROS     Infectious Disease:  - neg infectious disease ROS     Malignancy:  - neg malignancy ROS     Other:  - neg other ROS          Physical Exam    Airway  airway exam normal      Mallampati: I   TM distance: > 3 FB   Neck ROM: full   Mouth opening: > 3 cm    Respiratory Devices and Support         Dental           Cardiovascular          Rhythm and rate: regular and normal (-) no murmur    Pulmonary           breath sounds clear to auscultation   (-) no wheezes        OUTSIDE LABS:  CBC:   Lab Results   Component Value Date    WBC 8.3 2023    WBC 19.0 (H) 2023    HGB 9.2 (L) 2023    HGB 9.3 (L) 2023    HCT 29.5  (L) 04/03/2023    HCT 27.5 (L) 03/11/2023     04/03/2023     03/11/2023     BMP:   Lab Results   Component Value Date     04/03/2023     03/10/2023    POTASSIUM 3.7 04/03/2023    POTASSIUM 3.4 (L) 04/03/2023    CHLORIDE 106 04/03/2023    CHLORIDE 107 03/10/2023    CO2 28 04/03/2023    CO2 24 03/10/2023    BUN 8 04/03/2023    BUN 5 (L) 03/10/2023    CR 0.60 04/03/2023    CR 0.55 (L) 03/10/2023     04/03/2023    GLC 75 03/10/2023     COAGS:   Lab Results   Component Value Date    PTT 25 03/11/2023    INR 0.99 03/11/2023    FIBR 493 (H) 03/11/2023     POC: No results found for: BGM, HCG, HCGS  HEPATIC:   Lab Results   Component Value Date    ALT <9 03/10/2023    AST 15 03/10/2023     OTHER:   Lab Results   Component Value Date    KD 9.0 04/03/2023    MAG 1.7 (L) 04/03/2023    CRP 4.7 (H) 04/03/2023       Anesthesia Plan    ASA Status:  1   NPO Status:  NPO Appropriate    Anesthesia Type: MAC.   Induction: Intravenous, Propofol.   Maintenance: TIVA.        Consents    Anesthesia Plan(s) and associated risks, benefits, and realistic alternatives discussed. Questions answered and patient/representative(s) expressed understanding.     - Discussed: Risks, Benefits and Alternatives for BOTH SEDATION and the PROCEDURE were discussed     - Discussed with:  Patient      - Patient is DNR/DNI Status: No         Postoperative Care    Pain management: IV analgesics, Oral pain medications, Multi-modal analgesia.   PONV prophylaxis: Ondansetron (or other 5HT-3), Dexamethasone or Solumedrol, Scopolamine patch     Comments:    Other Comments: TIVA with Propofol  Zofran for PONV            Andrew Ritchie MD

## 2023-04-04 NOTE — ANESTHESIA POSTPROCEDURE EVALUATION
Patient: Stefany Slater    Procedure: Procedure(s):  INCISION AND DRAINAGE, RIGHT BREAST ABSCESS       Anesthesia Type:  MAC    Note:  Disposition: Admission   Postop Pain Control: Uneventful            Sign Out: Well controlled pain   PONV: No   Neuro/Psych: Uneventful            Sign Out: Acceptable/Baseline neuro status   Airway/Respiratory: Uneventful            Sign Out: Acceptable/Baseline resp. status   CV/Hemodynamics: Uneventful            Sign Out: Acceptable CV status; No obvious hypovolemia; No obvious fluid overload   Other NRE: NONE   DID A NON-ROUTINE EVENT OCCUR? No           Last vitals:  Vitals:    04/04/23 0750 04/04/23 0936 04/04/23 0937   BP: (!) 143/82 131/73 131/73   Pulse: 80 80 85   Resp: 16 16 16   Temp: 36.8  C (98.3  F) (!) 35.8  C (96.5  F) 36.5  C (97.7  F)   SpO2: 99% 97% 99%       Electronically Signed By: Andrew Ritchie MD  April 4, 2023  10:11 AM

## 2023-04-04 NOTE — PHARMACY-VANCOMYCIN DOSING SERVICE
Pharmacy Vancomycin Note  Date of Service 2023  Patient's  1995   27 year old, female    Indication: Abscess  Day of Therapy: 2  Current vancomycin regimen:  1250 mg IV q12h  Current vancomycin monitoring method: AUC  Current vancomycin therapeutic monitoring goal: 400-600 mg*h/L    [employer]ight[employer] Prediction of Current Vancomycin Regimen  Regimen: 1250 mg IV every 12 hours.  Start time: 16:14 on 2023  Exposure target: AUC24 (range)400-600 mg/L.hr   AUC24,ss: 351 mg/L.hr  Probability of AUC24 > 400: 26 %  Ctrough,ss: 7.8 mg/L  Probability of Ctrough,ss > 20: 0 %  Probability of nephrotoxicity (Lodise DARLING ): 4 %    Current estimated CrCl = Estimated Creatinine Clearance: 167 mL/min (based on SCr of 0.6 mg/dL).    Creatinine for last 3 days  4/3/2023:  1:36 AM Creatinine 0.60 mg/dL    Recent Vancomycin Levels (past 3 days)  2023:  1:12 PM Vancomycin 7.1 mg/L    Vancomycin IV Administrations (past 72 hours)                   vancomycin (VANCOCIN) 1,250 mg in 0.9% NaCl 250 mL intermittent infusion (mg) 1,250 mg New Bag 23 0414     1,250 mg New Bag 23 1648    vancomycin (VANCOCIN) 1,500 mg in 0.9% NaCl 250 mL intermittent infusion (mg) 1,500 mg New Bag 23 0409                Nephrotoxins and other renal medications (From now, onward)    Start     Dose/Rate Route Frequency Ordered Stop    23 1400  vancomycin (VANCOCIN) 1,250 mg in 0.9% NaCl 250 mL intermittent infusion         1,250 mg  over 90 Minutes Intravenous EVERY 8 HOURS 23 1356      23 0524  ibuprofen (ADVIL/MOTRIN) tablet 600 mg         600 mg Oral EVERY 6 HOURS PRN 23 0525               Contrast Orders - past 72 hours (72h ago, onward)    None          Interpretation of levels and current regimen:  Vancomycin level is reflective of AUC less than 400    Has serum creatinine changed greater than 50% in last 72 hours: No    Urine output:  good urine output    Renal Function: Stable    [employer]  Prediction of Planned New Vancomycin Regimen  Regimen: 1250 mg IV every 8 hours.  Start time: 16:14 on 04/04/2023  Exposure target: AUC24 (range)400-600 mg/L.hr   AUC24,ss: 526 mg/L.hr  Probability of AUC24 > 400: 92 %  Ctrough,ss: 14.1 mg/L  Probability of Ctrough,ss > 20: 11 %  Probability of nephrotoxicity (Lodise DARLING 2009): 9 %    Plan:  1. Increase Dose to 1250 mg IV Q8H  2. Vancomycin monitoring method: AUC  3. Vancomycin therapeutic monitoring goal: 400-600 mg*h/L  4. Pharmacy will check vancomycin levels as appropriate in 1-3 Days.  5. Serum creatinine levels will be ordered daily for the first week of therapy and at least twice weekly for subsequent weeks.    Bret Rod [facility]

## 2023-04-04 NOTE — ANESTHESIA CARE TRANSFER NOTE
Patient: Stefany Slater    Procedure: Procedure(s):  INCISION AND DRAINAGE, RIGHT BREAST ABSCESS       Diagnosis: Mastitis [N61.0]  Diagnosis Additional Information: No value filed.    Anesthesia Type:   MAC     Note:    Oropharynx: oropharynx clear of all foreign objects  Level of Consciousness: awake  Oxygen Supplementation: room air    Independent Airway: airway patency satisfactory and stable  Dentition: dentition unchanged  Vital Signs Stable: post-procedure vital signs reviewed and stable  Report to RN Given: handoff report given  Patient transferred to: Labor and Delivery    Handoff Report: Identifed the Patient, Identified the Reponsible Provider, Reviewed the pertinent medical history, Discussed the surgical course, Reviewed Intra-OP anesthesia mangement and issues during anesthesia, Set expectations for post-procedure period and Allowed opportunity for questions and acknowledgement of understanding      Vitals:  Vitals Value Taken Time   /73 04/04/23 0937   Temp 36.5  C (97.7  F) 04/04/23 0937   Pulse 85 04/04/23 0937   Resp 16 04/04/23 0937   SpO2 99 % 04/04/23 0937       Electronically Signed By: JENNA Bridges CRNA  April 4, 2023  9:37 AM

## 2023-04-04 NOTE — PLAN OF CARE
Problem: Plan of Care - These are the overarching goals to be used throughout the patient stay.    Goal: Absence of Hospital-Acquired Illness or Injury  Intervention: Prevent Skin Injury  Recent Flowsheet Documentation  Taken 4/4/2023 1600 by Nayeli Elziabeth RN  Body Position: position changed independently  Taken 4/4/2023 0936 by Nayeli Elizabeth RN  Body Position: position changed independently  Taken 4/4/2023 0800 by Nayeli Elizabeth RN  Body Position: position changed independently  Taken 4/4/2023 0750 by Nayeli Elizabeth RN  Body Position: position changed independently     Problem: Plan of Care - These are the overarching goals to be used throughout the patient stay.    Goal: Optimal Comfort and Wellbeing  Outcome: Progressing  Intervention: Monitor Pain and Promote Comfort  Recent Flowsheet Documentation  Taken 4/4/2023 1600 by Nayeli Elizabeth RN  Pain Management Interventions:   repositioned   rest   medication (see MAR)  Taken 4/4/2023 0750 by Nayeli Elizabeth RN  Pain Management Interventions:   repositioned   rest     Problem: Plan of Care - These are the overarching goals to be used throughout the patient stay.    Goal: Optimal Comfort and Wellbeing  Intervention: Monitor Pain and Promote Comfort  Recent Flowsheet Documentation  Taken 4/4/2023 1600 by Nayeli Elizabeth RN  Pain Management Interventions:   repositioned   rest   medication (see MAR)  Taken 4/4/2023 0750 by Nayeli Elizabeth RN  Pain Management Interventions:   repositioned   rest     Problem: Infection  Goal: Absence of Infection Signs and Symptoms  Outcome: Progressing   Goal Outcome Evaluation:      Plan of Care Reviewed With: patient  I & D of R breast was done this morning, penrose drain was placed with gauze dressing, which was changed twice with two pumping sessions. Pain was managed with Ibuprofen and Tylenol. Potassium of 3.2 was replaced and the recheck was 3.5.

## 2023-04-04 NOTE — OP NOTE
[facility]    Operative Note    Pre-operative diagnosis: Mastitis [N61.0]  Post-operative diagnosis Same as pre-operative diagnosis, right breast abscess    Procedure: Procedure(s):  INCISION AND DRAINAGE, RIGHT BREAST ABSCESS  Surgeon: Surgeon(s) and Role:     * Papito Araujo DO - Primary  Anesthesia: MAC   Estimated Blood Loss: 5 mL from 4/4/2023  8:49 AM to 4/4/2023  9:26 AM      Drains: None  Specimens:   ID Type Source Tests Collected by Time Destination   A : right breast Swab Breast, Right ANAEROBIC BACTERIAL CULTURE ROUTINE, GRAM STAIN, AEROBIC BACTERIAL CULTURE ROUTINE Papito Araujo DO 4/4/2023  9:13 AM      Findings:     - induration of breast was approximatley 7cm x 6 cm in the right upper outer quadrant.   - wound cultures obtained.   - approximately 10 ml of purulent material drained from right breast abscess.     Complications: None.  Implants: * No implants in log *    Indication: 27-year-old female presenting with right-sided breast pain that was refractory to antibiotic and conservative management.  Offered the patient procedure of right-sided breast incision and drainage of abscess after reviewing the images with the patient.  The risks and benefits of the procedure were explained detail to the patient. The risks include infection, bleeding, damage to the surrounding structures. Patient verbalized understanding provided consent to undergo the procedure above.    Procedure: Patient was brought to the operating room placed on the operating table in the supine position.  Patient underwent MAC sedation. The patient's right breast was then prepped and draped in the usual fashion.      1% lidocaine with epinephrine was injected over the right breast where the abscess was located.   then marked out the perimeter of the induration.  At the 10 o'clock position at the border of the areola, I made a 1 cm incision with a 10 blade.  I then used a hemostat to dissect with in a  direction towards the right upper outer quadrant of the breast where the abscess was located.  Immediately there was a gush of purulent material.  Wound cultures were obtained.  I then used the hemostat to break up all the loculations of the abscess.  Sterile saline was used to irrigate out the abscess pocket.      The patient did not want any packing.  As a result, a 1/4 inch Penrose was then used to be placed into the abscess pocket to allow drainage.  A 3-0 nylon stitch was then used to anchor the Penrose position.  Sterile dressings were then applied.  Patient tolerated the procedure well no complications.  At the end of the procedure, a final count was completed.  I was told that all sharps, sponges were accounted for.    Papito Araujo DO  General Surgeon  [facility]  Surgery [facility] - [address] [address]?  Office: [phone]  Employed by - [facility]  Pager: [phone]

## 2023-04-04 NOTE — PROGRESS NOTES
General Surgery Progress Note:    Hospital Day # 1    ASSESSMENT:   1. Mastitis        Stefany Slater is a 27 year old female with right breast mastitis + abscess.     PLAN:   - to the OR for right breast incision and drianage  - The risks and benefits of the procedure were explained detail to the patient. The risks include infection, bleeding, damage to the surrounding structures. Patient verbalized understanding provided consent to undergo the procedure above.        SUBJECTIVE:   Stefany Slater was seen on rounds.  Patient still has some fullness and right breast pain.  N.p.o. preparation for surgery.  No new complaints.    Patient Vitals for the past 24 hrs:   BP Temp Temp src Pulse Resp SpO2   04/04/23 0750 (!) 143/82 98.3  F (36.8  C) Oral 80 16 99 %   04/04/23 0418 112/68 98.2  F (36.8  C) Oral 53 16 96 %   04/04/23 0005 134/86 97.8  F (36.6  C) Oral 72 18 97 %   04/03/23 2045 131/84 98.2  F (36.8  C) Oral 80 16 97 %   04/03/23 1554 134/85 98.2  F (36.8  C) Oral 61 16 98 %   04/03/23 0929 127/86 97.5  F (36.4  C) Oral 75 16 97 %       Physical Exam:  General: NAD, pleasant  CV:RRR  LUNGS:Normal respiratory effort, no accessory muscle use  EXT:no CCE  Skin: Right breast fullness.  Tender to palpation in the 10 o'clock position in the right upper outer quadrant.    Admission on 04/03/2023   Component Date Value     Sodium 04/03/2023 142      Potassium 04/03/2023 2.4 (LL)      Chloride 04/03/2023 106      Carbon Dioxide (CO2) 04/03/2023 28      Anion Gap 04/03/2023 8      Urea Nitrogen 04/03/2023 8      Creatinine 04/03/2023 0.60      Calcium 04/03/2023 9.0      Glucose 04/03/2023 112      GFR Estimate 04/03/2023 >90      CRP 04/03/2023 4.7 (H)      WBC Count 04/03/2023 8.3      RBC Count 04/03/2023 3.37 (L)      Hemoglobin 04/03/2023 9.2 (L)      Hematocrit 04/03/2023 29.5 (L)      MCV 04/03/2023 88      MCH 04/03/2023 27.3      MCHC 04/03/2023 31.2 (L)      RDW 04/03/2023 17.5 (H)      Platelet Count  04/03/2023 295      % Neutrophils 04/03/2023 61      % Lymphocytes 04/03/2023 25      % Monocytes 04/03/2023 10      % Eosinophils 04/03/2023 3      % Basophils 04/03/2023 0      % Immature Granulocytes 04/03/2023 1      NRBCs per 100 WBC 04/03/2023 0      Absolute Neutrophils 04/03/2023 5.1      Absolute Lymphocytes 04/03/2023 2.1      Absolute Monocytes 04/03/2023 0.9      Absolute Eosinophils 04/03/2023 0.2      Absolute Basophils 04/03/2023 0.0      Absolute Immature Granul* 04/03/2023 0.0      Absolute NRBCs 04/03/2023 0.0      Magnesium 04/03/2023 1.7 (L)      Potassium 04/03/2023 3.0 (L)      Potassium 04/03/2023 3.4 (L)      Potassium 04/03/2023 3.7         DO Papito Fletcher DO  General Surgeon  [facility]  Surgery [address] [address]?  Office: [phone]  Employed by - [employer]  Pager: [phone]

## 2023-04-04 NOTE — PROGRESS NOTES
Pumped breastmilk removed from fridge in [facility] and given to  to take home per patient's request.    Reyna Olivier RN  4/3/2023  10:11 PM

## 2023-04-04 NOTE — PLAN OF CARE
Problem: Infection  Goal: Absence of Infection Signs and Symptoms  Outcome: Progressing   Patient has been afebrile this shift, VSS. Right breast pain, using scheduled tylenol for pain control. Received IV Vanco as ordered.    Patient has been pumping this shift and milk has been stored in [facility] fridge.     Pt has a scheduled procedure at 0905, pt updated on time of procedure.     Reyna Olivier RN  4/4/2023  6:42 AM

## 2023-04-05 VITALS
HEART RATE: 72 BPM | HEIGHT: 70 IN | RESPIRATION RATE: 16 BRPM | OXYGEN SATURATION: 96 % | BODY MASS INDEX: 24.12 KG/M2 | WEIGHT: 168.5 LBS | DIASTOLIC BLOOD PRESSURE: 70 MMHG | SYSTOLIC BLOOD PRESSURE: 120 MMHG | TEMPERATURE: 97.9 F

## 2023-04-05 PROBLEM — N61.1 BREAST ABSCESS: Status: ACTIVE | Noted: 2023-04-05

## 2023-04-05 LAB
ANION GAP SERPL CALCULATED.3IONS-SCNC: 5 MMOL/L (ref 5–18)
BUN SERPL-MCNC: 6 MG/DL (ref 8–22)
CALCIUM SERPL-MCNC: 8.7 MG/DL (ref 8.5–10.5)
CHLORIDE BLD-SCNC: 109 MMOL/L (ref 98–107)
CO2 SERPL-SCNC: 27 MMOL/L (ref 22–31)
CREAT SERPL-MCNC: 0.53 MG/DL (ref 0.6–1.1)
ERYTHROCYTE [DISTWIDTH] IN BLOOD BY AUTOMATED COUNT: 17.9 % (ref 10–15)
GFR SERPL CREATININE-BSD FRML MDRD: >90 ML/MIN/1.73M2
GLUCOSE BLD-MCNC: 72 MG/DL (ref 70–125)
GLUCOSE BLDC GLUCOMTR-MCNC: 70 MG/DL (ref 70–99)
HCT VFR BLD AUTO: 30.5 % (ref 35–47)
HGB BLD-MCNC: 9.1 G/DL (ref 11.7–15.7)
MAGNESIUM SERPL-MCNC: 1.8 MG/DL (ref 1.8–2.6)
MCH RBC QN AUTO: 27.4 PG (ref 26.5–33)
MCHC RBC AUTO-ENTMCNC: 29.8 G/DL (ref 31.5–36.5)
MCV RBC AUTO: 92 FL (ref 78–100)
PHOSPHATE SERPL-MCNC: 3 MG/DL (ref 2.5–4.5)
PLATELET # BLD AUTO: 290 10E3/UL (ref 150–450)
POTASSIUM BLD-SCNC: 3.6 MMOL/L (ref 3.5–5)
RBC # BLD AUTO: 3.32 10E6/UL (ref 3.8–5.2)
SODIUM SERPL-SCNC: 141 MMOL/L (ref 136–145)
WBC # BLD AUTO: 5.6 10E3/UL (ref 4–11)

## 2023-04-05 PROCEDURE — 250N000011 HC RX IP 250 OP 636: Performed by: OBSTETRICS & GYNECOLOGY

## 2023-04-05 PROCEDURE — 250N000013 HC RX MED GY IP 250 OP 250 PS 637: Performed by: SURGERY

## 2023-04-05 PROCEDURE — 83735 ASSAY OF MAGNESIUM: CPT | Performed by: SURGERY

## 2023-04-05 PROCEDURE — 84100 ASSAY OF PHOSPHORUS: CPT | Performed by: SURGERY

## 2023-04-05 PROCEDURE — 250N000013 HC RX MED GY IP 250 OP 250 PS 637: Performed by: OBSTETRICS & GYNECOLOGY

## 2023-04-05 PROCEDURE — 36415 COLL VENOUS BLD VENIPUNCTURE: CPT | Performed by: SURGERY

## 2023-04-05 PROCEDURE — 258N000003 HC RX IP 258 OP 636: Performed by: OBSTETRICS & GYNECOLOGY

## 2023-04-05 PROCEDURE — 80048 BASIC METABOLIC PNL TOTAL CA: CPT | Performed by: SURGERY

## 2023-04-05 PROCEDURE — 85027 COMPLETE CBC AUTOMATED: CPT | Performed by: SURGERY

## 2023-04-05 RX ORDER — SULFAMETHOXAZOLE/TRIMETHOPRIM 800-160 MG
1 TABLET ORAL 2 TIMES DAILY
Qty: 20 TABLET | Refills: 0 | Status: SHIPPED | OUTPATIENT
Start: 2023-04-05 | End: 2023-04-15

## 2023-04-05 RX ADMIN — IBUPROFEN 600 MG: 600 TABLET ORAL at 01:08

## 2023-04-05 RX ADMIN — SENNOSIDES AND DOCUSATE SODIUM 2 TABLET: 50; 8.6 TABLET ORAL at 08:28

## 2023-04-05 RX ADMIN — ACETAMINOPHEN 975 MG: 325 TABLET ORAL at 13:27

## 2023-04-05 RX ADMIN — OXYCODONE HYDROCHLORIDE 10 MG: 5 TABLET ORAL at 05:58

## 2023-04-05 RX ADMIN — VENLAFAXINE HYDROCHLORIDE 37.5 MG: 37.5 CAPSULE, EXTENDED RELEASE ORAL at 08:28

## 2023-04-05 RX ADMIN — ACETAMINOPHEN 975 MG: 325 TABLET ORAL at 05:59

## 2023-04-05 RX ADMIN — POLYETHYLENE GLYCOL 3350 17 G: 17 POWDER, FOR SOLUTION ORAL at 08:28

## 2023-04-05 RX ADMIN — VANCOMYCIN HYDROCHLORIDE 1250 MG: 5 INJECTION, POWDER, LYOPHILIZED, FOR SOLUTION INTRAVENOUS at 06:01

## 2023-04-05 RX ADMIN — VANCOMYCIN HYDROCHLORIDE 1250 MG: 5 INJECTION, POWDER, LYOPHILIZED, FOR SOLUTION INTRAVENOUS at 13:27

## 2023-04-05 ASSESSMENT — ACTIVITIES OF DAILY LIVING (ADL)
ADLS_ACUITY_SCORE: 18

## 2023-04-05 NOTE — PROGRESS NOTES
ASSESSMENT:  1. Mastitis        Stefany Slater is a 27 year old female who is s/p I&D of right breast for mastitis with abscess on 4/4, POD# 1     PLAN:  -adat  -change outer dressing daily and more often prn for drainage  -penrose removed  -ok with discharge from our standpoint once abx in place    Rikki Sanderson PA-C  [facility]  Surgery [facility] - [address]  [address]  [place] [address]?  Office: [phone]      SUBJECTIVE:   She is doing better, pain improved    Patient Vitals for the past 24 hrs:   BP Temp Temp src Pulse Resp SpO2   04/05/23 0833 120/70 97.9  F (36.6  C) Oral 72 16 96 %   04/04/23 2308 122/77 98.7  F (37.1  C) Oral 72 16 97 %   04/04/23 1713 121/77 98.2  F (36.8  C) Oral 78 16 98 %   04/04/23 1100 133/78 -- -- 72 16 98 %   04/04/23 0937 131/73 97.7  F (36.5  C) -- 85 16 99 %   04/04/23 0936 131/73 (!) 96.5  F (35.8  C) Axillary 80 16 97 %        PHYSICAL EXAM:  GEN: No acute distress, comfortable  Breast: right breast - dressing removed, hazy serous output, erythema seems to be fading, a little bit of induration around incision still but much smaller and less tender than prior to surgery, penrose removed  EXT:No cyanosis, edema or obvious abnormalities    04/04 0700 - 04/05 0659  In: -   Out: 5     Lab Results   Component Value Date    WBC 8.3 04/03/2023    HGB 9.2 04/03/2023    HCT 29.5 04/03/2023    MCV 88 04/03/2023     04/03/2023     INR/Prothrombin Time  Recent Labs   Lab 04/03/23  0136      CO2 28   BUN 8     Lab Results   Component Value Date    ALT <9 03/10/2023    AST 15 03/10/2023

## 2023-04-05 NOTE — PLAN OF CARE
Problem: Plan of Care - These are the overarching goals to be used throughout the patient stay.    Goal: Optimal Comfort and Wellbeing  Intervention: Monitor Pain and Promote Comfort  Recent Flowsheet Documentation  Taken 4/5/2023 0108 by Unique Renee RN  Pain Management Interventions:    repositioned    rest     Problem: Plan of Care - These are the overarching goals to be used throughout the patient stay.    Goal: Optimal Comfort and Wellbeing  Intervention: Provide Person-Centered Care  Recent Flowsheet Documentation  Taken 4/5/2023 0100 by Unique Renee RN  Trust Relationship/Rapport:    care explained    choices provided    emotional support provided    empathic listening provided    questions answered    questions encouraged    thoughts/feelings acknowledged    reassurance provided     Problem: Infection  Goal: Absence of Infection Signs and Symptoms  Outcome: Progressing   Goal Outcome Evaluation:  Patient vitally stable. Rating pain at 7/10 this shift, controlled with tylenol, ibuprofen, and oxycodone x1. A&Ox4. Patient is 3 weeks postpartum and pumping, milk is being stored in [facility] fridge. Right breast draining moderate amount of serosanguinous fluid. Right breast is firm and tender with blanchable redness. Patient saline locked between ordered vancomycin doses. Voiding spontaneously. Up independently in room. Patient care and support on-going.  Unique Renee RN

## 2023-04-05 NOTE — DISCHARGE SUMMARY
[facility] Discharge Summary    Stefany Slater MRN# [id number]   Age: 27 year old YOB: 1995     Date of Admission:  4/3/2023  Date of Discharge::  2023   Admitting Physician:  Dr. Hines  Discharge Physician:  Leann Trivedi MD     Home clinic: [facility]            Admission Diagnoses:   Mastitis [N61.0]  Breast abscess- right breast           Discharge Diagnosis:   Same as above           Procedures:   Procedure(s):  *INCISION AND DRAINAGE, RIGHT BREAST ABSCESS**       Iv antibiotics            Medications Prior to Admission:     Medications Prior to Admission   Medication Sig Dispense Refill Last Dose     acetaminophen (TYLENOL) 325 MG tablet Take 2 tablets (650 mg) by mouth every 6 hours as needed for mild pain or fever 20 tablet 0 2023 at prn     ferrous sulfate (FEROSUL) 325 (65 Fe) MG tablet Take 325 mg by mouth daily (with breakfast)   2023 at am     ibuprofen (ADVIL/MOTRIN) 800 MG tablet Take 1 tablet (800 mg) by mouth every 6 hours as needed for other (cramping) 30 tablet 0 2023 at prn     venlafaxine (EFFEXOR XR) 37.5 MG 24 hr capsule Take 37.5 mg by mouth daily   2023 at am     [DISCONTINUED] cephALEXin (KEFLEX) 500 MG capsule Take 500 mg by mouth 4 times daily For 10 days starting 23.   2023             Discharge Medications:     Current Discharge Medication List      START taking these medications    Details   sulfamethoxazole-trimethoprim (BACTRIM DS) 800-160 MG tablet Take 1 tablet by mouth 2 times daily for 10 days  Qty: 20 tablet, Refills: 0    Associated Diagnoses: Breast abscess         CONTINUE these medications which have NOT CHANGED    Details   acetaminophen (TYLENOL) 325 MG tablet Take 2 tablets (650 mg) by mouth every 6 hours as needed for mild pain or fever  Qty: 20 tablet, Refills: 0    Associated Diagnoses:  (normal spontaneous vaginal delivery)      ferrous sulfate (FEROSUL) 325 (65 Fe) MG tablet Take 325 mg  by mouth daily (with breakfast)      ibuprofen (ADVIL/MOTRIN) 800 MG tablet Take 1 tablet (800 mg) by mouth every 6 hours as needed for other (cramping)  Qty: 30 tablet, Refills: 0    Associated Diagnoses:  (normal spontaneous vaginal delivery)      venlafaxine (EFFEXOR XR) 37.5 MG 24 hr capsule Take 37.5 mg by mouth daily         STOP taking these medications       cephALEXin (KEFLEX) 500 MG capsule Comments:   Reason for Stopping:                     Consultations:   General surgery           Brief History of Illness:   Admitted with right breast abscess- failed outpatient oral antibiotic therapy.            Hospital Course:   The patient's hospital course was unremarkable.  She recovered as anticipated and experienced no post-operative complications.           Discharge Instructions and Follow-Up:   Discharge diet: Regular   Discharge activity: No driving for 2 week(s)   Discharge follow-up: Follow up with Dr. Hines  in 2 weeks   Wound care Drink plenty of fluids  Ice to area for comfort  Keep wound clean and dry           Discharge Disposition:   Discharged to home      Attestation:  I have reviewed today's vital signs, notes, medications, labs and imaging.    Leann Trivedi MD

## 2023-04-05 NOTE — LACTATION NOTE
Consulted with RN and MD on the use of Bactrim and breast feeding.  [facility] Medications and Mothers Milk rates it an L3, which means its probably compatible and no need to pump and dump.

## 2023-04-05 NOTE — DISCHARGE INSTRUCTIONS
Discharge diet: Regular   Discharge activity: No driving for 2 week(s)   Discharge follow-up: Follow up with Dr. Hines  in 2 weeks    Dr. Van BENNETT [facility]  Surgery [address]  [place] [address]?  Office: [phone]     Wound care Drink plenty of fluids  Ice to area for comfort  Keep wound clean and dry & change outer dressing daily and more often prn for drainage       polyethylene glycol (MIRALAX) Packet 17 g     To prevent constipation. Mixed prescribed dose in 8 ounces of water, juice or soda. Administer daily starting at 0900 on POD 1. Hold for loose stools.    senna-docusate (SENOKOT-S/PERICOLACE) 8.6-50 MG per tablet 1 tablet     If no bowel movement in 24 hours, increase to 2 tablets by mouth.   Hold for loose stools.

## 2023-04-05 NOTE — PLAN OF CARE
Patient discharged to home. Patient verbalized understanding of discharge instructions and all questions answered.

## 2023-04-07 LAB — BACTERIA ABSC ANAEROBE+AEROBE CULT: ABNORMAL

## 2023-04-11 LAB — BACTERIA ABSC ANAEROBE+AEROBE CULT: NORMAL

## 2023-04-26 ENCOUNTER — MEDICAL CORRESPONDENCE (OUTPATIENT)
Dept: HEALTH INFORMATION MANAGEMENT | Facility: CLINIC | Age: 28
End: 2023-04-26
Payer: OTHER GOVERNMENT

## 2023-04-26 ENCOUNTER — LAB REQUISITION (OUTPATIENT)
Dept: LAB | Facility: CLINIC | Age: 28
End: 2023-04-26
Payer: OTHER GOVERNMENT

## 2023-04-26 DIAGNOSIS — Z12.4 ENCOUNTER FOR SCREENING FOR MALIGNANT NEOPLASM OF CERVIX: ICD-10-CM

## 2023-04-26 PROCEDURE — G0145 SCR C/V CYTO,THINLAYER,RESCR: HCPCS | Mod: ORL | Performed by: OBSTETRICS & GYNECOLOGY

## 2023-04-26 PROCEDURE — 87624 HPV HI-RISK TYP POOLED RSLT: CPT | Mod: ORL | Performed by: OBSTETRICS & GYNECOLOGY

## 2023-04-28 ENCOUNTER — TRANSCRIBE ORDERS (OUTPATIENT)
Dept: OTHER | Age: 28
End: 2023-04-28

## 2023-04-28 DIAGNOSIS — N63.0 BREAST LUMP: Primary | ICD-10-CM

## 2023-05-01 LAB
BKR LAB AP GYN ADEQUACY: NORMAL
BKR LAB AP GYN INTERPRETATION: NORMAL
BKR LAB AP HPV REFLEX: NORMAL
BKR LAB AP LMP: NORMAL
BKR LAB AP PREVIOUS ABNL DX: NORMAL
BKR LAB AP PREVIOUS ABNORMAL: NORMAL
PATH REPORT.COMMENTS IMP SPEC: NORMAL
PATH REPORT.COMMENTS IMP SPEC: NORMAL
PATH REPORT.RELEVANT HX SPEC: NORMAL

## 2023-05-02 ENCOUNTER — OFFICE VISIT (OUTPATIENT)
Dept: SURGERY | Facility: CLINIC | Age: 28
End: 2023-05-02
Attending: OBSTETRICS & GYNECOLOGY
Payer: OTHER GOVERNMENT

## 2023-05-02 DIAGNOSIS — Z87.898 HISTORY OF MASTITIS: Primary | ICD-10-CM

## 2023-05-02 LAB
HUMAN PAPILLOMA VIRUS 16 DNA: NEGATIVE
HUMAN PAPILLOMA VIRUS 18 DNA: NEGATIVE
HUMAN PAPILLOMA VIRUS FINAL DIAGNOSIS: NORMAL
HUMAN PAPILLOMA VIRUS OTHER HR: NEGATIVE

## 2023-05-02 PROCEDURE — 99213 OFFICE O/P EST LOW 20 MIN: CPT | Performed by: SPECIALIST

## 2023-05-02 PROCEDURE — G0463 HOSPITAL OUTPT CLINIC VISIT: HCPCS | Performed by: SPECIALIST

## 2023-05-02 RX ORDER — ETONOGESTREL AND ETHINYL ESTRADIOL .12; .015 MG/D; MG/D
RING VAGINAL
COMMUNITY
Start: 2023-04-26

## 2023-05-02 NOTE — PROGRESS NOTES
This is a 27-year-old young woman who I am asked to see for recommendations for what may be recurrent mastitis.  The patient is currently nursing 8-month-old son.  About a month ago she developed severe mastitis and then an abscess that required operative drainage by Dr. Araujo.  She has been doing okay since then but is starting to try to wean and is worried about increased firmness in the upper part of the right breast.  She denies any fevers.  She just feels very engorged.    Past Medical History:   Diagnosis Date     Anxiety      Breast abscess 4/5/2023     History of blood transfusion      Hypertension      PONV (postoperative nausea and vomiting)        Current Outpatient Medications:      acetaminophen (TYLENOL) 325 MG tablet, Take 2 tablets (650 mg) by mouth every 6 hours as needed for mild pain or fever, Disp: 20 tablet, Rfl: 0     ELURYNG 0.12-0.015 MG/24HR vaginal ring, , Disp: , Rfl:      ferrous sulfate (FEROSUL) 325 (65 Fe) MG tablet, Take 325 mg by mouth daily (with breakfast), Disp: , Rfl:      ibuprofen (ADVIL/MOTRIN) 800 MG tablet, Take 1 tablet (800 mg) by mouth every 6 hours as needed for other (cramping), Disp: 30 tablet, Rfl: 0     venlafaxine (EFFEXOR XR) 37.5 MG 24 hr capsule, Take 37.5 mg by mouth daily, Disp: , Rfl:         Allergies   Allergen Reactions     Nuts      Physical exam:  Both breasts are quite engorged.  Excoriation on both nipples.  There is no significant erythema in either breast.  She has some firmness in nodularity in the upper aspect of the right breast but this feels like its typical for a nursing breast and somebody who has had previous surgery.  You currently can even see the scar from I&D.    Impression: History of mastitis/abscess of the right breast.  I do not think she has any ongoing mastitis at this time.  I told her she can safely continue to try to wean down and talk to her about how to do that.  She should not only look at the amount of time between each time  she pumps but should also look at the volume she is pumping.  (She is currently not nursing at all as he was unable to latch).  I also made sure she knows not to pump till she is dry.  She should just pump to comfort level so that she does not keep telling her brain to make more milk.  Plan: Follow-up with me can be as needed.  If she develops any new signs of infection she is needs to call us and we can get her on antibiotics or she can call her OB/GYN.

## 2023-05-02 NOTE — LETTER
5/2/2023         RE: Stefany Slater  [address] [place] [id number]        Dear Colleague,    Thank you for referring your patient, Stefany Slater, to the [facility] [place]. Please see a copy of my visit note below.    This is a 27-year-old young woman who I am asked to see for recommendations for what may be recurrent mastitis.  The patient is currently nursing 8-month-old son.  About a month ago she developed severe mastitis and then an abscess that required operative drainage by Dr. Araujo.  She has been doing okay since then but is starting to try to wean and is worried about increased firmness in the upper part of the right breast.  She denies any fevers.  She just feels very engorged.    Past Medical History:   Diagnosis Date     Anxiety      Breast abscess 4/5/2023     History of blood transfusion      Hypertension      PONV (postoperative nausea and vomiting)        Current Outpatient Medications:      acetaminophen (TYLENOL) 325 MG tablet, Take 2 tablets (650 mg) by mouth every 6 hours as needed for mild pain or fever, Disp: 20 tablet, Rfl: 0     ELURYNG 0.12-0.015 MG/24HR vaginal ring, , Disp: , Rfl:      ferrous sulfate (FEROSUL) 325 (65 Fe) MG tablet, Take 325 mg by mouth daily (with breakfast), Disp: , Rfl:      ibuprofen (ADVIL/MOTRIN) 800 MG tablet, Take 1 tablet (800 mg) by mouth every 6 hours as needed for other (cramping), Disp: 30 tablet, Rfl: 0     venlafaxine (EFFEXOR XR) 37.5 MG 24 hr capsule, Take 37.5 mg by mouth daily, Disp: , Rfl:         Allergies   Allergen Reactions     Nuts      Physical exam:  Both breasts are quite engorged.  Excoriation on both nipples.  There is no significant erythema in either breast.  She has some firmness in nodularity in the upper aspect of the right breast but this feels like its typical for a nursing breast and somebody who has had previous surgery.  You currently can even see the scar from I&D.    Impression: History of  mastitis/abscess of the right breast.  I do not think she has any ongoing mastitis at this time.  I told her she can safely continue to try to wean down and talk to her about how to do that.  She should not only look at the amount of time between each time she pumps but should also look at the volume she is pumping.  (She is currently not nursing at all as he was unable to latch).  I also made sure she knows not to pump till she is dry.  She should just pump to comfort level so that she does not keep telling her brain to make more milk.  Plan: Follow-up with me can be as needed.  If she develops any new signs of infection she is needs to call us and we can get her on antibiotics or she can call her OB/GYN.      Again, thank you for allowing me to participate in the care of your patient.        Sincerely,        Carolina Valerio MD

## 2023-05-02 NOTE — NURSING NOTE
Stefany presents to [facility] for a surgical consult with Dr. Valerio  regarding follow up of right breast breastfeeding mastitis. Support and encouragement provided to patient as she is ready to stop breastfeeding.  RN assessment and EMR update.  Patient met with Dr. Valerio .  See dictation for details of visit and follow up plan.   RN time 12 mins.

## 2023-08-23 NOTE — PROGRESS NOTES
"S: Patient resting in bed.  Starting to feel some discomfort with contractions.    O:  /57   Pulse 99   Temp 98.4  F (36.9  C) (Oral)   Resp 16   Ht 1.778 m (5' 10\")   Wt 88 kg (194 lb)   SpO2 99%   BMI 27.84 kg/m      FHT category I    General: NAD    A/P:  IUP at 38 weeks  IOL for gTHNG  ON cytotec for ripening  Reviewed options for pain control, patient ultimately plans on an epidural.  Reviewed options of nitrous or fentanyl for pain control also.  She is interested in trying fentanyl prior to epidural.    MD Rommel  " Patient called back, said she had read the [facility]. She scheduled an appt for next week to discuss options for osteoporosis.

## 2023-10-22 ENCOUNTER — HEALTH MAINTENANCE LETTER (OUTPATIENT)
Age: 28
End: 2023-10-22

## 2024-05-17 ENCOUNTER — LAB REQUISITION (OUTPATIENT)
Dept: LAB | Facility: CLINIC | Age: 29
End: 2024-05-17
Payer: OTHER GOVERNMENT

## 2024-05-17 DIAGNOSIS — Z12.4 ENCOUNTER FOR SCREENING FOR MALIGNANT NEOPLASM OF CERVIX: ICD-10-CM

## 2024-05-17 PROCEDURE — 87624 HPV HI-RISK TYP POOLED RSLT: CPT | Mod: ORL | Performed by: NURSE PRACTITIONER

## 2024-05-17 PROCEDURE — G0145 SCR C/V CYTO,THINLAYER,RESCR: HCPCS | Mod: ORL | Performed by: NURSE PRACTITIONER

## 2024-05-20 LAB
HPV HR 12 DNA CVX QL NAA+PROBE: NEGATIVE
HPV16 DNA CVX QL NAA+PROBE: NEGATIVE
HPV18 DNA CVX QL NAA+PROBE: NEGATIVE
HUMAN PAPILLOMA VIRUS FINAL DIAGNOSIS: NORMAL

## 2024-05-22 LAB
BKR LAB AP GYN ADEQUACY: NORMAL
BKR LAB AP GYN INTERPRETATION: NORMAL
PATH REPORT.COMMENTS IMP SPEC: NORMAL
PATH REPORT.COMMENTS IMP SPEC: NORMAL

## 2024-12-15 ENCOUNTER — HEALTH MAINTENANCE LETTER (OUTPATIENT)
Age: 29
End: 2024-12-15

## 2025-07-11 ENCOUNTER — LAB REQUISITION (OUTPATIENT)
Dept: LAB | Facility: CLINIC | Age: 30
End: 2025-07-11
Payer: COMMERCIAL

## 2025-07-11 DIAGNOSIS — Z32.01 ENCOUNTER FOR PREGNANCY TEST, RESULT POSITIVE: ICD-10-CM

## 2025-07-11 PROCEDURE — 87086 URINE CULTURE/COLONY COUNT: CPT | Mod: ORL | Performed by: NURSE PRACTITIONER

## 2025-07-12 LAB — BACTERIA UR CULT: NORMAL

## 2025-08-07 ENCOUNTER — LAB REQUISITION (OUTPATIENT)
Dept: LAB | Facility: CLINIC | Age: 30
End: 2025-08-07
Payer: COMMERCIAL

## 2025-08-07 DIAGNOSIS — Z87.59 PERSONAL HISTORY OF OTHER COMPLICATIONS OF PREGNANCY, CHILDBIRTH AND THE PUERPERIUM: ICD-10-CM

## 2025-08-07 DIAGNOSIS — Z11.3 ENCOUNTER FOR SCREENING FOR INFECTIONS WITH A PREDOMINANTLY SEXUAL MODE OF TRANSMISSION: ICD-10-CM

## 2025-08-07 DIAGNOSIS — Z34.90 ENCOUNTER FOR SUPERVISION OF NORMAL PREGNANCY, UNSPECIFIED, UNSPECIFIED TRIMESTER: ICD-10-CM

## 2025-08-07 LAB
ABO + RH BLD: NORMAL
ALBUMIN MFR UR ELPH: 6.6 MG/DL
BASOPHILS # BLD AUTO: 0 10E3/UL (ref 0–0.2)
BASOPHILS NFR BLD AUTO: 0 %
CREAT UR-MCNC: 52.7 MG/DL
EOSINOPHIL # BLD AUTO: 0.1 10E3/UL (ref 0–0.7)
EOSINOPHIL NFR BLD AUTO: 1 %
ERYTHROCYTE [DISTWIDTH] IN BLOOD BY AUTOMATED COUNT: 14 % (ref 10–15)
EST. AVERAGE GLUCOSE BLD GHB EST-MCNC: 103 MG/DL
HBA1C MFR BLD: 5.2 %
HCT VFR BLD AUTO: 35.9 % (ref 35–47)
HGB BLD-MCNC: 12.6 G/DL (ref 11.7–15.7)
HIV 1+2 AB+HIV1 P24 AG SERPL QL IA: NONREACTIVE
HOLD SPECIMEN: NORMAL
IMM GRANULOCYTES # BLD: 0 10E3/UL
IMM GRANULOCYTES NFR BLD: 0 %
LYMPHOCYTES # BLD AUTO: 2 10E3/UL (ref 0.8–5.3)
LYMPHOCYTES NFR BLD AUTO: 25 %
MCH RBC QN AUTO: 31.1 PG (ref 26.5–33)
MCHC RBC AUTO-ENTMCNC: 35.1 G/DL (ref 31.5–36.5)
MCV RBC AUTO: 89 FL (ref 78–100)
MONOCYTES # BLD AUTO: 0.8 10E3/UL (ref 0–1.3)
MONOCYTES NFR BLD AUTO: 10 %
NEUTROPHILS # BLD AUTO: 5 10E3/UL (ref 1.6–8.3)
NEUTROPHILS NFR BLD AUTO: 63 %
NRBC # BLD AUTO: 0 10E3/UL
NRBC BLD AUTO-RTO: 0 /100
PLATELET # BLD AUTO: 270 10E3/UL (ref 150–450)
PROT/CREAT 24H UR: 0.13 MG/MG CR (ref 0–0.2)
RBC # BLD AUTO: 4.05 10E6/UL (ref 3.8–5.2)
SPECIMEN EXP DATE BLD: NORMAL
WBC # BLD AUTO: 7.8 10E3/UL (ref 4–11)

## 2025-08-07 PROCEDURE — 80081 OBSTETRIC PANEL INC HIV TSTG: CPT | Mod: ORL | Performed by: OBSTETRICS & GYNECOLOGY

## 2025-08-07 PROCEDURE — 86803 HEPATITIS C AB TEST: CPT | Mod: ORL | Performed by: OBSTETRICS & GYNECOLOGY

## 2025-08-07 PROCEDURE — 84156 ASSAY OF PROTEIN URINE: CPT | Mod: ORL | Performed by: OBSTETRICS & GYNECOLOGY

## 2025-08-07 PROCEDURE — 87491 CHLMYD TRACH DNA AMP PROBE: CPT | Mod: ORL | Performed by: OBSTETRICS & GYNECOLOGY

## 2025-08-07 PROCEDURE — 83036 HEMOGLOBIN GLYCOSYLATED A1C: CPT | Mod: ORL | Performed by: OBSTETRICS & GYNECOLOGY

## 2025-08-07 PROCEDURE — 86706 HEP B SURFACE ANTIBODY: CPT | Mod: ORL | Performed by: OBSTETRICS & GYNECOLOGY

## 2025-08-07 PROCEDURE — 86704 HEP B CORE ANTIBODY TOTAL: CPT | Mod: ORL | Performed by: OBSTETRICS & GYNECOLOGY

## 2025-08-07 PROCEDURE — 80053 COMPREHEN METABOLIC PANEL: CPT | Mod: ORL | Performed by: OBSTETRICS & GYNECOLOGY

## 2025-08-08 LAB
ABO + RH BLD: NORMAL
ALBUMIN SERPL BCG-MCNC: 4.3 G/DL (ref 3.5–5.2)
ALP SERPL-CCNC: 68 U/L (ref 40–150)
ALT SERPL W P-5'-P-CCNC: 10 U/L (ref 0–50)
ANION GAP SERPL CALCULATED.3IONS-SCNC: 11 MMOL/L (ref 7–15)
AST SERPL W P-5'-P-CCNC: 19 U/L (ref 0–45)
BILIRUB SERPL-MCNC: 0.3 MG/DL
BLD GP AB SCN SERPL QL: NEGATIVE
BUN SERPL-MCNC: 5.6 MG/DL (ref 6–20)
C TRACH DNA SPEC QL PROBE+SIG AMP: NEGATIVE
CALCIUM SERPL-MCNC: 9.8 MG/DL (ref 8.8–10.4)
CHLORIDE SERPL-SCNC: 103 MMOL/L (ref 98–107)
CREAT SERPL-MCNC: 0.43 MG/DL (ref 0.51–0.95)
EGFRCR SERPLBLD CKD-EPI 2021: >90 ML/MIN/1.73M2
GLUCOSE SERPL-MCNC: 83 MG/DL (ref 70–99)
HBV CORE AB SERPL QL IA: NONREACTIVE
HBV SURFACE AB SERPL IA-ACNC: <3.5 M[IU]/ML
HBV SURFACE AB SERPL IA-ACNC: NONREACTIVE M[IU]/ML
HBV SURFACE AG SERPL QL IA: NONREACTIVE
HCO3 SERPL-SCNC: 21 MMOL/L (ref 22–29)
HCV AB SERPL QL IA: NONREACTIVE
N GONORRHOEA DNA SPEC QL NAA+PROBE: NEGATIVE
POTASSIUM SERPL-SCNC: 3.9 MMOL/L (ref 3.4–5.3)
PROT SERPL-MCNC: 7 G/DL (ref 6.4–8.3)
RPR SER QL: NONREACTIVE
SODIUM SERPL-SCNC: 135 MMOL/L (ref 135–145)
SPECIMEN EXP DATE BLD: NORMAL
SPECIMEN TYPE: NORMAL

## 2025-08-11 LAB
RUBV IGG SERPL QL IA: 0.85 INDEX
RUBV IGG SERPL QL IA: NORMAL

## (undated) DEVICE — SUCTION MANIFOLD NEPTUNE 2 SYS 4 PORT 0702-020-000

## (undated) DEVICE — COLLECTION KIT E SWAB REG 220245

## (undated) DEVICE — PREP POVIDONE-IODINE 10% SOLUTION 4OZ BOTTLE MDS093944

## (undated) DEVICE — DRSG ABD TNDRSRB WET PRUF 8IN X 10IN STRL  9194A

## (undated) DEVICE — TUBE PENROSE 1/4 X 12 STRL 30414-025

## (undated) DEVICE — SOL NACL 0.9% IRRIG 1000ML BOTTLE 2F7124

## (undated) DEVICE — TRAY PREP DRY SKIN SCRUB 067

## (undated) DEVICE — PREP POVIDONE-IODINE 7.5% SCRUB 4OZ BOTTLE MDS093945

## (undated) DEVICE — SOLUTION IRRIG 2B7127 .9NS 3000ML BAG

## (undated) DEVICE — PLATE GROUNDING ADULT W/CORD 9165L

## (undated) DEVICE — CUSTOM PACK GEN MAJOR SBA5BGMHEA

## (undated) DEVICE — SU ETHILON 3-0 PS-1 18" 1663G

## (undated) DEVICE — GLOVE UNDER INDICATOR PI SZ 7.0 LF 41670

## (undated) DEVICE — DRAPE OR LAPAROTOMY KC 89228*

## (undated) DEVICE — DRSG GAUZE 4X4" TRAY 6939

## (undated) DEVICE — SUCTION TIP YANKAUER W/O VENT K86

## (undated) DEVICE — TAPE ADH MEDIPORE 6IN SOFT CLOTH 2866

## (undated) DEVICE — SOL WATER IRRIG 1000ML BOTTLE 2F7114

## (undated) RX ORDER — FENTANYL CITRATE 50 UG/ML
INJECTION, SOLUTION INTRAMUSCULAR; INTRAVENOUS
Status: DISPENSED
Start: 2023-04-04

## (undated) RX ORDER — PROPOFOL 10 MG/ML
INJECTION, EMULSION INTRAVENOUS
Status: DISPENSED
Start: 2023-04-04

## (undated) RX ORDER — ONDANSETRON 2 MG/ML
INJECTION INTRAMUSCULAR; INTRAVENOUS
Status: DISPENSED
Start: 2023-04-04

## (undated) RX ORDER — LIDOCAINE HYDROCHLORIDE 10 MG/ML
INJECTION, SOLUTION EPIDURAL; INFILTRATION; INTRACAUDAL; PERINEURAL
Status: DISPENSED
Start: 2023-04-04